# Patient Record
Sex: FEMALE | Race: WHITE | Employment: FULL TIME | URBAN - NONMETROPOLITAN AREA
[De-identification: names, ages, dates, MRNs, and addresses within clinical notes are randomized per-mention and may not be internally consistent; named-entity substitution may affect disease eponyms.]

---

## 2017-03-01 PROBLEM — Z41.1 ENCOUNTER FOR COSMETIC SURGERY: Status: ACTIVE | Noted: 2017-03-01

## 2023-05-23 PROBLEM — R35.0 URINARY FREQUENCY: Status: ACTIVE | Noted: 2020-10-19

## 2023-05-23 PROBLEM — F31.81 BIPOLAR 2 DISORDER, MAJOR DEPRESSIVE EPISODE (HCC): Status: ACTIVE | Noted: 2017-08-18

## 2023-05-23 PROBLEM — G89.29 CHRONIC LOW BACK PAIN: Status: ACTIVE | Noted: 2019-11-26

## 2023-05-23 PROBLEM — J45.40 MODERATE PERSISTENT ASTHMA WITHOUT COMPLICATION: Status: ACTIVE | Noted: 2017-09-08

## 2023-05-23 PROBLEM — G47.30 SLEEP APNEA: Status: ACTIVE | Noted: 2023-05-23

## 2023-05-23 PROBLEM — M54.50 CHRONIC LOW BACK PAIN: Status: ACTIVE | Noted: 2019-11-26

## 2023-05-23 RX ORDER — HYDROCODONE BITARTRATE AND ACETAMINOPHEN 5; 325 MG/1; MG/1
TABLET ORAL
COMMUNITY
Start: 2023-05-12

## 2023-05-23 RX ORDER — CITALOPRAM 40 MG/1
40 TABLET ORAL DAILY
COMMUNITY
End: 2023-06-09

## 2023-05-23 RX ORDER — GABAPENTIN 800 MG/1
800 TABLET ORAL 4 TIMES DAILY
COMMUNITY

## 2023-05-23 RX ORDER — LITHIUM CARBONATE 300 MG
150 TABLET ORAL 2 TIMES DAILY
COMMUNITY
Start: 2023-05-05 | End: 2023-06-09

## 2023-05-24 ENCOUNTER — OFFICE VISIT (OUTPATIENT)
Dept: SURGERY | Age: 53
End: 2023-05-24
Payer: COMMERCIAL

## 2023-05-24 VITALS
BODY MASS INDEX: 36.98 KG/M2 | SYSTOLIC BLOOD PRESSURE: 118 MMHG | DIASTOLIC BLOOD PRESSURE: 80 MMHG | HEART RATE: 58 BPM | TEMPERATURE: 97.3 F | WEIGHT: 235.6 LBS | HEIGHT: 67 IN | OXYGEN SATURATION: 97 %

## 2023-05-24 DIAGNOSIS — K61.1 PERIRECTAL ABSCESS: Primary | ICD-10-CM

## 2023-05-24 PROBLEM — M54.16 LUMBAR RADICULOPATHY: Status: ACTIVE | Noted: 2021-06-16

## 2023-05-24 PROCEDURE — 99204 OFFICE O/P NEW MOD 45 MIN: CPT | Performed by: SURGERY

## 2023-05-24 RX ORDER — AMOXICILLIN AND CLAVULANATE POTASSIUM 875; 125 MG/1; MG/1
TABLET, FILM COATED ORAL
COMMUNITY
Start: 2023-05-22

## 2023-05-24 RX ORDER — ALBUTEROL SULFATE 90 UG/1
AEROSOL, METERED RESPIRATORY (INHALATION)
COMMUNITY

## 2023-05-24 RX ORDER — LATANOPROST 50 UG/ML
SOLUTION/ DROPS OPHTHALMIC
COMMUNITY

## 2023-05-24 RX ORDER — TOPIRAMATE 25 MG/1
TABLET ORAL
COMMUNITY
Start: 2023-03-03

## 2023-05-24 RX ORDER — SOLIFENACIN SUCCINATE 10 MG/1
TABLET, FILM COATED ORAL
COMMUNITY
Start: 2023-03-03

## 2023-05-24 RX ORDER — ONDANSETRON 4 MG/1
TABLET, ORALLY DISINTEGRATING ORAL
COMMUNITY

## 2023-05-24 NOTE — PROGRESS NOTES
Procedure Note - Incision and Drainage Abscess      5/24/2023  Toney Kearney [id number]    Date of Service:  5/24/2023    Pre Op Dx: Abscess of the left perirectal region    Post Op Dx: Same    Procedure: Incision and drainage of perirectal abscess. Surgeon: Silvino Lopez DO    Anesthesia: Local anesthesia only    EBL: * No surgery found *    Complications: none    Specimen:  None    Drains: none    Findings: Abscess cavity measuring 3 cm X 3 cm X 4 cm (deep) with a scant amount of purulent fluid      Operative Procedure:  Informed consent was obtained from the the patient. After the full disclosure of risks, benefits and alternative therapies, the area was assessed and the procedure was performed in the office. The patient was positioned in the right lateral decubitus position. The infected region was prepped and draped in the usual sterile fashion. Next, 1% Lidocaine with epinephrine was used to infiltrate the area of the planned procedure in the orientation of the proposed incision. Using a #11 blade, an incision was carried through the layers of the subcutaneous tissues and a scant amount of purulent fluid  was expressed from wound. Cultures were taken, both aerobic and anaerobic. The incision was  extended in a horizontal orientation to allow for continued drainage. Blunt dissection of the abscess cavity extending through the subcutaneous tissues, breaking up all loculations. Manual compression of the affected area was also performed to express any residual fluid. The wound was copiously irrigated with normal saline, and inspected for any further evidence of loculation or drainage from untreated areas. There was none identified. Hemostasis was ensured with electrocautery. The abscess cavity was packed with nothing, and allowed to drain freely. Dressed with pressure dressing using 4x4s. Thank you for allowing me to participate in the care of your patient.   My best effort and attention was

## 2023-06-02 ENCOUNTER — OFFICE VISIT (OUTPATIENT)
Dept: SURGERY | Age: 53
End: 2023-06-02

## 2023-06-02 VITALS
HEART RATE: 72 BPM | HEIGHT: 67 IN | RESPIRATION RATE: 18 BRPM | TEMPERATURE: 97.5 F | DIASTOLIC BLOOD PRESSURE: 64 MMHG | BODY MASS INDEX: 36.26 KG/M2 | SYSTOLIC BLOOD PRESSURE: 112 MMHG | WEIGHT: 231 LBS | OXYGEN SATURATION: 96 %

## 2023-06-02 DIAGNOSIS — T81.49XA POST-OPERATIVE WOUND ABSCESS: Primary | ICD-10-CM

## 2023-06-02 PROCEDURE — 99024 POSTOP FOLLOW-UP VISIT: CPT | Performed by: SURGERY

## 2023-06-02 RX ORDER — CIPROFLOXACIN 500 MG/1
500 TABLET, FILM COATED ORAL 2 TIMES DAILY
Qty: 14 TABLET | Refills: 0 | Status: SHIPPED | OUTPATIENT
Start: 2023-06-02 | End: 2023-06-09

## 2023-06-02 NOTE — PROGRESS NOTES
Procedure Note - Incision and Drainage Abscess      6/2/2023  Tiana Baez [id number]    Date of Service:  6/2/2023    Pre Op Dx: Abscess of the left perirectal region    Post Op Dx: Same    Procedure: Repeat incision and drainage of perirectal abscess. Surgeon: Rossy Betancourt DO    Anesthesia: Local anesthesia only    EBL: Minimal    Complications: none    Specimen:  None; no evidence of abscess or purulent drainage    Drains: none    Findings: Presumed abscess cavity in previous location    Operative Procedure:  Informed consent was obtained from the the patient. After the full disclosure of risks, benefits and alternative therapies, the area was assessed and the procedure was performed in the office. The patient was positioned in the right lateral decubitus position. The infected region was prepped and draped in the usual sterile fashion. Next, 1% Lidocaine with epinephrine was used to infiltrate the area of the planned procedure in the orientation of the proposed incision. Using a #11 blade, an incision was carried through the layers of the subcutaneous tissues and no fluid  was expressed from wound. Cultures were taken, both aerobic and anaerobic. The incision was  extended in a horizontal orientation to allow for continued drainage. Blunt dissection of the abscess cavity extending through the subcutaneous tissues, breaking up all loculations. Manual compression of the affected area was also performed to express any residual fluid. The wound was copiously irrigated with normal saline, and inspected for any further evidence of loculation or drainage from untreated areas. There was none identified. Hemostasis was ensured with electrocautery. The abscess cavity was packed with 1/4 inch plain NuGauze. Thank you for allowing me to participate in the care of your patient. My best effort and attention was provided. DR. Ashok Richardson.  MYRA, [address], DO
solifenacin (VESICARE) 10 MG tablet solifenacin 10 mg tablet      topiramate (TOPAMAX) 25 MG tablet       ondansetron (ZOFRAN-ODT) 4 MG disintegrating tablet ondansetron 4 mg disintegrating tablet   DISSOLVE 1 TABLET IN MOUTH EVERY 6 TO 8 HOURS AS NEEDED      loratadine-pseudoephedrine (CLARITIN-D 24HR)  MG per extended release tablet Allergy Relief and Nasal Decongestant 10 mg-240 mg tablet,extended rel   TAKE 1 TABLET BY MOUTH ONCE DAILY FOR 14 DAYS      latanoprost (XALATAN) 0.005 % ophthalmic solution latanoprost 0.005 % eye drops      hydrocortisone 2.5 % cream hydrocortisone 2.5 % topical cream with perineal applicator      albuterol sulfate HFA (PROVENTIL;VENTOLIN;PROAIR) 108 (90 Base) MCG/ACT inhaler albuterol sulfate HFA 90 mcg/actuation aerosol inhaler      citalopram (CELEXA) 40 MG tablet Take 1 tablet by mouth daily      gabapentin (NEURONTIN) 800 MG tablet Take 1 tablet by mouth in the morning, at noon, in the evening, and at bedtime. lithium 300 MG tablet Take 0.5 tablets by mouth in the morning and at bedtime      HYDROcodone-acetaminophen (NORCO) 5-325 MG per tablet        No current facility-administered medications for this visit.      No Known Allergies  Social History     Socioeconomic History    Marital status:      Spouse name: Not on file    Number of children: Not on file    Years of education: Not on file    Highest education level: Not on file   Occupational History    Not on file   Tobacco Use    Smoking status: Former     Types: Cigarettes     Quit date: 2015     Years since quittin.1    Smokeless tobacco: Not on file   Vaping Use    Vaping Use: Never used   Substance and Sexual Activity    Alcohol use: No    Drug use: No    Sexual activity: Yes     Partners: Male   Other Topics Concern    Not on file   Social History Narrative    Not on file     Social Determinants of Health     Financial Resource Strain: Not on file   Food Insecurity: Not on file

## 2023-06-09 ENCOUNTER — OFFICE VISIT (OUTPATIENT)
Dept: SURGERY | Age: 53
End: 2023-06-09

## 2023-06-09 VITALS
BODY MASS INDEX: 35.63 KG/M2 | HEIGHT: 67 IN | HEART RATE: 57 BPM | SYSTOLIC BLOOD PRESSURE: 104 MMHG | RESPIRATION RATE: 18 BRPM | TEMPERATURE: 97.6 F | OXYGEN SATURATION: 98 % | DIASTOLIC BLOOD PRESSURE: 60 MMHG | WEIGHT: 227 LBS

## 2023-06-09 DIAGNOSIS — Z09 SURGICAL FOLLOWUP VISIT: Primary | ICD-10-CM

## 2023-06-09 RX ORDER — DULOXETIN HYDROCHLORIDE 30 MG/1
CAPSULE, DELAYED RELEASE ORAL
COMMUNITY
Start: 2023-06-02

## 2023-06-09 RX ORDER — AMOXICILLIN 500 MG/1
CAPSULE ORAL
COMMUNITY
Start: 2023-05-26

## 2023-06-09 NOTE — PROGRESS NOTES
Date of Service:  6/9/2023    Subjective:     Patient ID: Romeo Martines is a 52 y.o.  female. Chief Complaint: Follow up appointment from procedure for I&D perirectal abscess x 2    Patient has no complaints of pain or drainage at the previous incision site.      Past Medical History:   Diagnosis Date    Anxiety     Asthma     Bipolar disorder ([facility] [address]     Depression     Glaucoma     Hypertension     Neuropathy     Wears glasses      Family History   Problem Relation Age of Onset    Other Mother         sleep apnea    Heart Disease Mother     High Blood Pressure Mother     Stroke Mother     COPD Father     Cancer Father         skin    Heart Disease Sister     High Blood Pressure Sister     Kidney Disease Sister         has 1 kidney    COPD Brother         hypotensive    Diabetes Maternal Grandmother     Heart Disease Maternal Grandmother     High Blood Pressure Maternal Grandmother     Kidney Disease Maternal Grandmother         has 1 kidney    Stroke Maternal Grandmother     Heart Disease Maternal Grandfather     High Blood Pressure Maternal Grandfather     Kidney Disease Maternal Grandfather     Stroke Maternal Grandfather     Asthma Neg Hx      Past Surgical History:   Procedure Laterality Date    BLADDER SUSPENSION      CHOLECYSTECTOMY, LAPAROSCOPIC  2013    COLONOSCOPY  11/30/2018    Dr. Teresa Jaffe, COLON, DIAGNOSTIC      FOOT SURGERY Left 03/2016    screws placed in foot for fracture    GASTRIC BYPASS SURGERY  2002    HEEL SPUR SURGERY Right     TONSILLECTOMY      as child    Bethany Tracy  2005    Dr. Anderson \[facility]\""     Current Outpatient Medications   Medication Sig Dispense Refill    amoxicillin (AMOXIL) 500 MG capsule amoxicillin 500 mg capsule   TAKE 1 CAPSULE BY MOUTH EVERY 8 HOURS FOR 10 DAYS      DULoxetine (CYMBALTA) 30 MG extended release capsule       solifenacin (VESICARE) 10 MG tablet solifenacin 10 mg tablet      topiramate (TOPAMAX) 25 MG tablet

## 2023-12-08 ENCOUNTER — HOSPITAL ENCOUNTER (INPATIENT)
Age: 53
LOS: 3 days | Discharge: HOME HEALTH | DRG: 395 | End: 2023-12-11
Attending: SURGERY | Admitting: SURGERY
Payer: COMMERCIAL

## 2023-12-08 DIAGNOSIS — K61.1 PERIRECTAL ABSCESS: Primary | ICD-10-CM

## 2023-12-08 LAB
ANION GAP SERPL CALC-SCNC: 9 MEQ/L (ref 8–16)
BASOPHILS ABSOLUTE: 0 THOU/MM3 (ref 0–0.1)
BASOPHILS NFR BLD AUTO: 0.6 %
BUN SERPL-MCNC: 15 MG/DL (ref 7–22)
CALCIUM SERPL-MCNC: 8.8 MG/DL (ref 8.5–10.5)
CHLORIDE SERPL-SCNC: 109 MEQ/L (ref 98–111)
CO2 SERPL-SCNC: 22 MEQ/L (ref 23–33)
CREAT SERPL-MCNC: 0.8 MG/DL (ref 0.4–1.2)
DEPRECATED RDW RBC AUTO: 49 FL (ref 35–45)
EOSINOPHIL NFR BLD AUTO: 1.2 %
EOSINOPHILS ABSOLUTE: 0.1 THOU/MM3 (ref 0–0.4)
ERYTHROCYTE [DISTWIDTH] IN BLOOD BY AUTOMATED COUNT: 14.9 % (ref 11.5–14.5)
GFR SERPL CREATININE-BSD FRML MDRD: > 60 ML/MIN/1.73M2
GLUCOSE SERPL-MCNC: 238 MG/DL (ref 70–108)
HCT VFR BLD AUTO: 35.1 % (ref 37–47)
HGB BLD-MCNC: 11 GM/DL (ref 12–16)
IMM GRANULOCYTES # BLD AUTO: 0.02 THOU/MM3 (ref 0–0.07)
IMM GRANULOCYTES NFR BLD AUTO: 0.3 %
LYMPHOCYTES ABSOLUTE: 1.6 THOU/MM3 (ref 1–4.8)
LYMPHOCYTES NFR BLD AUTO: 23.7 %
MCH RBC QN AUTO: 28.4 PG (ref 26–33)
MCHC RBC AUTO-ENTMCNC: 31.3 GM/DL (ref 32.2–35.5)
MCV RBC AUTO: 90.5 FL (ref 81–99)
MONOCYTES ABSOLUTE: 0.3 THOU/MM3 (ref 0.4–1.3)
MONOCYTES NFR BLD AUTO: 4.9 %
NEUTROPHILS NFR BLD AUTO: 69.3 %
NRBC BLD AUTO-RTO: 0 /100 WBC
PLATELET # BLD AUTO: 186 THOU/MM3 (ref 130–400)
PMV BLD AUTO: 10.5 FL (ref 9.4–12.4)
POTASSIUM SERPL-SCNC: 3.7 MEQ/L (ref 3.5–5.2)
RBC # BLD AUTO: 3.88 MILL/MM3 (ref 4.2–5.4)
SEGMENTED NEUTROPHILS ABSOLUTE COUNT: 4.8 THOU/MM3 (ref 1.8–7.7)
SODIUM SERPL-SCNC: 140 MEQ/L (ref 135–145)
WBC # BLD AUTO: 6.9 THOU/MM3 (ref 4.8–10.8)

## 2023-12-08 PROCEDURE — 1200000000 HC SEMI PRIVATE

## 2023-12-08 PROCEDURE — 6360000002 HC RX W HCPCS: Performed by: SURGERY

## 2023-12-08 PROCEDURE — 6360000002 HC RX W HCPCS: Performed by: NURSE PRACTITIONER

## 2023-12-08 PROCEDURE — 80048 BASIC METABOLIC PNL TOTAL CA: CPT

## 2023-12-08 PROCEDURE — 85025 COMPLETE CBC W/AUTO DIFF WBC: CPT

## 2023-12-08 PROCEDURE — 36415 COLL VENOUS BLD VENIPUNCTURE: CPT

## 2023-12-08 PROCEDURE — 2580000003 HC RX 258: Performed by: SURGERY

## 2023-12-08 PROCEDURE — 2580000003 HC RX 258: Performed by: NURSE PRACTITIONER

## 2023-12-08 RX ORDER — POTASSIUM CHLORIDE 7.45 MG/ML
10 INJECTION INTRAVENOUS PRN
Status: DISCONTINUED | OUTPATIENT
Start: 2023-12-08 | End: 2023-12-11 | Stop reason: HOSPADM

## 2023-12-08 RX ORDER — SODIUM CHLORIDE 0.9 % (FLUSH) 0.9 %
5-40 SYRINGE (ML) INJECTION PRN
Status: DISCONTINUED | OUTPATIENT
Start: 2023-12-08 | End: 2023-12-11 | Stop reason: HOSPADM

## 2023-12-08 RX ORDER — SODIUM CHLORIDE 9 MG/ML
INJECTION, SOLUTION INTRAVENOUS CONTINUOUS
Status: DISCONTINUED | OUTPATIENT
Start: 2023-12-08 | End: 2023-12-08

## 2023-12-08 RX ORDER — SODIUM CHLORIDE 0.9 % (FLUSH) 0.9 %
5-40 SYRINGE (ML) INJECTION EVERY 12 HOURS SCHEDULED
Status: DISCONTINUED | OUTPATIENT
Start: 2023-12-08 | End: 2023-12-11 | Stop reason: HOSPADM

## 2023-12-08 RX ORDER — ONDANSETRON 2 MG/ML
4 INJECTION INTRAMUSCULAR; INTRAVENOUS EVERY 6 HOURS PRN
Status: DISCONTINUED | OUTPATIENT
Start: 2023-12-08 | End: 2023-12-11 | Stop reason: HOSPADM

## 2023-12-08 RX ORDER — SODIUM CHLORIDE 9 MG/ML
INJECTION, SOLUTION INTRAVENOUS CONTINUOUS
Status: DISCONTINUED | OUTPATIENT
Start: 2023-12-08 | End: 2023-12-09

## 2023-12-08 RX ORDER — MAGNESIUM SULFATE IN WATER 40 MG/ML
2000 INJECTION, SOLUTION INTRAVENOUS PRN
Status: DISCONTINUED | OUTPATIENT
Start: 2023-12-08 | End: 2023-12-11 | Stop reason: HOSPADM

## 2023-12-08 RX ORDER — ONDANSETRON 4 MG/1
4 TABLET, ORALLY DISINTEGRATING ORAL EVERY 8 HOURS PRN
Status: DISCONTINUED | OUTPATIENT
Start: 2023-12-08 | End: 2023-12-11 | Stop reason: HOSPADM

## 2023-12-08 RX ORDER — POTASSIUM CHLORIDE 20 MEQ/1
40 TABLET, EXTENDED RELEASE ORAL PRN
Status: DISCONTINUED | OUTPATIENT
Start: 2023-12-08 | End: 2023-12-11 | Stop reason: HOSPADM

## 2023-12-08 RX ORDER — SODIUM CHLORIDE 9 MG/ML
INJECTION, SOLUTION INTRAVENOUS PRN
Status: DISCONTINUED | OUTPATIENT
Start: 2023-12-08 | End: 2023-12-11 | Stop reason: HOSPADM

## 2023-12-08 RX ADMIN — HYDROMORPHONE HYDROCHLORIDE 0.5 MG: 1 INJECTION, SOLUTION INTRAMUSCULAR; INTRAVENOUS; SUBCUTANEOUS at 19:18

## 2023-12-08 RX ADMIN — SODIUM CHLORIDE: 9 INJECTION, SOLUTION INTRAVENOUS at 23:57

## 2023-12-08 RX ADMIN — PIPERACILLIN AND TAZOBACTAM 3375 MG: 3; .375 INJECTION, POWDER, LYOPHILIZED, FOR SOLUTION INTRAVENOUS at 21:03

## 2023-12-08 RX ADMIN — PIPERACILLIN AND TAZOBACTAM 4500 MG: 4; .5 INJECTION, POWDER, FOR SOLUTION INTRAVENOUS at 15:50

## 2023-12-08 RX ADMIN — ONDANSETRON 4 MG: 2 INJECTION INTRAMUSCULAR; INTRAVENOUS at 15:52

## 2023-12-08 RX ADMIN — HYDROMORPHONE HYDROCHLORIDE 0.5 MG: 1 INJECTION, SOLUTION INTRAMUSCULAR; INTRAVENOUS; SUBCUTANEOUS at 23:27

## 2023-12-08 RX ADMIN — HYDROMORPHONE HYDROCHLORIDE 0.5 MG: 1 INJECTION, SOLUTION INTRAMUSCULAR; INTRAVENOUS; SUBCUTANEOUS at 15:52

## 2023-12-08 RX ADMIN — SODIUM CHLORIDE: 9 INJECTION, SOLUTION INTRAVENOUS at 15:50

## 2023-12-08 ASSESSMENT — PAIN SCALES - GENERAL
PAINLEVEL_OUTOF10: 7
PAINLEVEL_OUTOF10: 9
PAINLEVEL_OUTOF10: 8
PAINLEVEL_OUTOF10: 5
PAINLEVEL_OUTOF10: 8

## 2023-12-08 ASSESSMENT — PAIN DESCRIPTION - LOCATION
LOCATION: BUTTOCKS
LOCATION: RECTUM
LOCATION: BUTTOCKS

## 2023-12-08 ASSESSMENT — PAIN DESCRIPTION - PAIN TYPE
TYPE: ACUTE PAIN

## 2023-12-08 ASSESSMENT — PAIN DESCRIPTION - ORIENTATION
ORIENTATION: LEFT

## 2023-12-08 ASSESSMENT — PAIN DESCRIPTION - ONSET
ONSET: ON-GOING

## 2023-12-08 ASSESSMENT — PAIN DESCRIPTION - FREQUENCY
FREQUENCY: CONTINUOUS

## 2023-12-08 ASSESSMENT — PAIN - FUNCTIONAL ASSESSMENT
PAIN_FUNCTIONAL_ASSESSMENT: ACTIVITIES ARE NOT PREVENTED

## 2023-12-08 ASSESSMENT — PAIN DESCRIPTION - DESCRIPTORS
DESCRIPTORS: ACHING;NAGGING
DESCRIPTORS: ACHING;NAGGING
DESCRIPTORS: SHARP;SORE

## 2023-12-08 NOTE — PROGRESS NOTES
Pt is a direct admit from Dr. Pitts Render office, admitted to 6A rm 4 oriented to room call light in reach.

## 2023-12-09 ENCOUNTER — ANESTHESIA (OUTPATIENT)
Dept: OPERATING ROOM | Age: 53
End: 2023-12-09
Payer: COMMERCIAL

## 2023-12-09 ENCOUNTER — ANESTHESIA EVENT (OUTPATIENT)
Dept: OPERATING ROOM | Age: 53
End: 2023-12-09
Payer: COMMERCIAL

## 2023-12-09 LAB
REASON FOR REJECTION: NORMAL
REJECTED TEST: NORMAL

## 2023-12-09 PROCEDURE — 2580000003 HC RX 258: Performed by: SURGERY

## 2023-12-09 PROCEDURE — 3700000000 HC ANESTHESIA ATTENDED CARE: Performed by: SURGERY

## 2023-12-09 PROCEDURE — 6360000002 HC RX W HCPCS: Performed by: NURSE PRACTITIONER

## 2023-12-09 PROCEDURE — 6360000002 HC RX W HCPCS: Performed by: SURGERY

## 2023-12-09 PROCEDURE — 2500000003 HC RX 250 WO HCPCS: Performed by: SURGERY

## 2023-12-09 PROCEDURE — 0DBQ0ZZ EXCISION OF ANUS, OPEN APPROACH: ICD-10-PCS | Performed by: SURGERY

## 2023-12-09 PROCEDURE — 2709999900 HC NON-CHARGEABLE SUPPLY: Performed by: SURGERY

## 2023-12-09 PROCEDURE — 6360000002 HC RX W HCPCS: Performed by: ANESTHESIOLOGY

## 2023-12-09 PROCEDURE — 6360000002 HC RX W HCPCS

## 2023-12-09 PROCEDURE — 7100000001 HC PACU RECOVERY - ADDTL 15 MIN: Performed by: SURGERY

## 2023-12-09 PROCEDURE — 3600000012 HC SURGERY LEVEL 2 ADDTL 15MIN: Performed by: SURGERY

## 2023-12-09 PROCEDURE — 3600000002 HC SURGERY LEVEL 2 BASE: Performed by: SURGERY

## 2023-12-09 PROCEDURE — 2500000003 HC RX 250 WO HCPCS

## 2023-12-09 PROCEDURE — 3700000001 HC ADD 15 MINUTES (ANESTHESIA): Performed by: SURGERY

## 2023-12-09 PROCEDURE — 2580000003 HC RX 258: Performed by: NURSE PRACTITIONER

## 2023-12-09 PROCEDURE — 1200000000 HC SEMI PRIVATE

## 2023-12-09 PROCEDURE — 7100000000 HC PACU RECOVERY - FIRST 15 MIN: Performed by: SURGERY

## 2023-12-09 RX ORDER — LIDOCAINE HCL/PF 100 MG/5ML
SYRINGE (ML) INJECTION PRN
Status: DISCONTINUED | OUTPATIENT
Start: 2023-12-09 | End: 2023-12-09 | Stop reason: SDUPTHER

## 2023-12-09 RX ORDER — PROPOFOL 10 MG/ML
INJECTION, EMULSION INTRAVENOUS PRN
Status: DISCONTINUED | OUTPATIENT
Start: 2023-12-09 | End: 2023-12-09 | Stop reason: SDUPTHER

## 2023-12-09 RX ORDER — ONDANSETRON 2 MG/ML
INJECTION INTRAMUSCULAR; INTRAVENOUS PRN
Status: DISCONTINUED | OUTPATIENT
Start: 2023-12-09 | End: 2023-12-09 | Stop reason: SDUPTHER

## 2023-12-09 RX ORDER — GLYCOPYRROLATE 0.2 MG/ML
INJECTION INTRAMUSCULAR; INTRAVENOUS PRN
Status: DISCONTINUED | OUTPATIENT
Start: 2023-12-09 | End: 2023-12-09 | Stop reason: SDUPTHER

## 2023-12-09 RX ORDER — SODIUM CHLORIDE 9 MG/ML
INJECTION, SOLUTION INTRAVENOUS CONTINUOUS
Status: DISCONTINUED | OUTPATIENT
Start: 2023-12-09 | End: 2023-12-11 | Stop reason: HOSPADM

## 2023-12-09 RX ORDER — FENTANYL CITRATE 50 UG/ML
INJECTION, SOLUTION INTRAMUSCULAR; INTRAVENOUS PRN
Status: DISCONTINUED | OUTPATIENT
Start: 2023-12-09 | End: 2023-12-09 | Stop reason: SDUPTHER

## 2023-12-09 RX ORDER — DEXAMETHASONE SODIUM PHOSPHATE 10 MG/ML
INJECTION, EMULSION INTRAMUSCULAR; INTRAVENOUS PRN
Status: DISCONTINUED | OUTPATIENT
Start: 2023-12-09 | End: 2023-12-09 | Stop reason: SDUPTHER

## 2023-12-09 RX ORDER — FENTANYL CITRATE 50 UG/ML
INJECTION, SOLUTION INTRAMUSCULAR; INTRAVENOUS
Status: COMPLETED
Start: 2023-12-09 | End: 2023-12-09

## 2023-12-09 RX ORDER — FENTANYL CITRATE 50 UG/ML
50 INJECTION, SOLUTION INTRAMUSCULAR; INTRAVENOUS EVERY 5 MIN PRN
Status: DISCONTINUED | OUTPATIENT
Start: 2023-12-09 | End: 2023-12-09 | Stop reason: HOSPADM

## 2023-12-09 RX ADMIN — PROPOFOL 150 MG: 10 INJECTION, EMULSION INTRAVENOUS at 07:42

## 2023-12-09 RX ADMIN — FENTANYL CITRATE 50 MCG: 50 INJECTION, SOLUTION INTRAMUSCULAR; INTRAVENOUS at 08:04

## 2023-12-09 RX ADMIN — HYDROMORPHONE HYDROCHLORIDE 0.5 MG: 1 INJECTION, SOLUTION INTRAMUSCULAR; INTRAVENOUS; SUBCUTANEOUS at 16:01

## 2023-12-09 RX ADMIN — DEXAMETHASONE SODIUM PHOSPHATE 5 MG: 10 INJECTION, EMULSION INTRAMUSCULAR; INTRAVENOUS at 07:42

## 2023-12-09 RX ADMIN — PIPERACILLIN AND TAZOBACTAM 3375 MG: 3; .375 INJECTION, POWDER, LYOPHILIZED, FOR SOLUTION INTRAVENOUS at 16:30

## 2023-12-09 RX ADMIN — FENTANYL CITRATE 50 MCG: 50 INJECTION, SOLUTION INTRAMUSCULAR; INTRAVENOUS at 08:01

## 2023-12-09 RX ADMIN — HYDROMORPHONE HYDROCHLORIDE 0.5 MG: 1 INJECTION, SOLUTION INTRAMUSCULAR; INTRAVENOUS; SUBCUTANEOUS at 10:09

## 2023-12-09 RX ADMIN — HYDROMORPHONE HYDROCHLORIDE 0.5 MG: 1 INJECTION, SOLUTION INTRAMUSCULAR; INTRAVENOUS; SUBCUTANEOUS at 13:11

## 2023-12-09 RX ADMIN — GLYCOPYRROLATE 0.2 MG: 0.2 INJECTION INTRAMUSCULAR; INTRAVENOUS at 08:09

## 2023-12-09 RX ADMIN — PIPERACILLIN AND TAZOBACTAM 3375 MG: 3; .375 INJECTION, POWDER, LYOPHILIZED, FOR SOLUTION INTRAVENOUS at 05:46

## 2023-12-09 RX ADMIN — HYDROMORPHONE HYDROCHLORIDE 0.5 MG: 1 INJECTION, SOLUTION INTRAMUSCULAR; INTRAVENOUS; SUBCUTANEOUS at 20:38

## 2023-12-09 RX ADMIN — FENTANYL CITRATE 50 MCG: 50 INJECTION INTRAMUSCULAR; INTRAVENOUS at 08:35

## 2023-12-09 RX ADMIN — ONDANSETRON 4 MG: 2 INJECTION INTRAMUSCULAR; INTRAVENOUS at 07:42

## 2023-12-09 RX ADMIN — SODIUM CHLORIDE, PRESERVATIVE FREE 10 ML: 5 INJECTION INTRAVENOUS at 20:39

## 2023-12-09 RX ADMIN — Medication 100 MG: at 07:42

## 2023-12-09 RX ADMIN — FENTANYL CITRATE 50 MCG: 50 INJECTION INTRAMUSCULAR; INTRAVENOUS at 08:30

## 2023-12-09 RX ADMIN — HYDROMORPHONE HYDROCHLORIDE 0.5 MG: 1 INJECTION, SOLUTION INTRAMUSCULAR; INTRAVENOUS; SUBCUTANEOUS at 03:08

## 2023-12-09 ASSESSMENT — PAIN DESCRIPTION - LOCATION
LOCATION: BUTTOCKS

## 2023-12-09 ASSESSMENT — PAIN DESCRIPTION - DESCRIPTORS
DESCRIPTORS: SORE
DESCRIPTORS: SORE
DESCRIPTORS: PRESSURE
DESCRIPTORS: SORE

## 2023-12-09 ASSESSMENT — PAIN SCALES - GENERAL
PAINLEVEL_OUTOF10: 7
PAINLEVEL_OUTOF10: 8
PAINLEVEL_OUTOF10: 8
PAINLEVEL_OUTOF10: 5
PAINLEVEL_OUTOF10: 5
PAINLEVEL_OUTOF10: 8
PAINLEVEL_OUTOF10: 8

## 2023-12-09 ASSESSMENT — PAIN DESCRIPTION - ONSET
ONSET: ON-GOING
ONSET: ON-GOING

## 2023-12-09 ASSESSMENT — PAIN DESCRIPTION - FREQUENCY
FREQUENCY: CONTINUOUS
FREQUENCY: CONTINUOUS

## 2023-12-09 ASSESSMENT — PAIN - FUNCTIONAL ASSESSMENT
PAIN_FUNCTIONAL_ASSESSMENT: ACTIVITIES ARE NOT PREVENTED
PAIN_FUNCTIONAL_ASSESSMENT: ACTIVITIES ARE NOT PREVENTED

## 2023-12-09 ASSESSMENT — PAIN DESCRIPTION - PAIN TYPE: TYPE: ACUTE PAIN

## 2023-12-09 ASSESSMENT — PAIN DESCRIPTION - ORIENTATION
ORIENTATION: MID
ORIENTATION: MID
ORIENTATION: MID;INNER
ORIENTATION: LEFT

## 2023-12-09 NOTE — OP NOTE
[place] [address]                                OPERATIVE REPORT    PATIENT NAME: Eric Arauz                     :        1970  MED REC NO:   [id number]                           ROOM:       [id number]  ACCOUNT NO:   [de-identified]                           ADMIT DATE: 2023  PROVIDER:     Ke Mcdaniel. Loren Sy MD    DATE OF PROCEDURE:  2023    PREOPERATIVE DIAGNOSIS:  Chronic perianal abscess. POSTOPERATIVE DIAGNOSIS:  Chronic perianal abscess. OPERATION:  Excisional debridement 5 x 2 cm perianal tissue of chronic  perianal abscess. SURGEON:  Ke Mcdaniel. MD Flora.    ANESTHESIA:  General.    COMPLICATIONS:  None. BLOOD LOSS:  20 mL. INDICATIONS FOR PROCEDURE:  The patient is a 18-year-old female, who has  had two prior I and D's of a perianal abscess performed in an outpatient  setting per another physician. This occurring in May and then in early  . The patient apparently had healing, but then had onset over the  last two to three weeks of increasing pain and swelling and was seen in  the office yesterday with an area that was likely a fistula and then  lateral to that, a significantly indurated and inflamed tissue with  purulent fluid coming out of the rectum and from the fistula site. The  patient is here for anal exam and further I and D/debridement. FINDINGS:  With 24 hours of antibiotics, there was decreased pus coming  from the rectum. There was a fistula. The fistula was excised along  with a fistulous tract along with debridement of the perianal tissue. I  did not do a transsphincteric fistulotomy, but went retro-sphincter. There was no evidence of any further pus at the conclusion of the  procedure. DESCRIPTION OF THE PROCEDURE:  The patient was brought to the operating  suite, placed supine on the operating room table.   After adequate  inhalational anesthesia was administered, the patient was placed up in  stirrups and the perianal area was prepped and draped in usual sterile  fashion. The patient had palpation of the fistula site at about the 3  o'clock position in the perianal area, roughly 1 inch away from the anal  opening and then lateral to this was the induration and firmness. We  put a finger in the rectum initially. There were no palpable masses. I  put a retractor in the anus. I thought I could see possibly the entrance  of the fistula into the lateral anal canal on the left, but again there  was no pus coming out with palpation as it was yesterday. At this point  in time, I elected to do an elliptical incision to include the fistulous  opening on the skin and going laterally, and we entered into another  abscess cavity and the pus was drained. I did debridement of  chronically indurated tissue, the length of 5 x 2 cm. Once this was all  accomplished, it should be noted a blood fair amount due to inflammation  and hemostasis obtained electrocautery. We put the finger back in the  rectum and followed this inflammatory area that went retro-sphincter and  I felt like I had opened up the areas for drainage appropriately. I did  not feel that we should do a transsphincteric fistulotomy. At this  point in time, we irrigated with Irrisept. There was no further  purulent material and then placed a 1-inch iodoform gauze into the  wound. The patient tolerated the procedure well. KATHY OCONNELL [facility], MD    D: 12/09/2023 8:41:32       T: 12/09/2023 8:45:23     [id number]  Job#: [id number]     Doc#: [id number]    CC:

## 2023-12-09 NOTE — PLAN OF CARE
Problem: Discharge Planning  Goal: Discharge to home or other facility with appropriate resources  Outcome: Progressing  Flowsheets  Taken 12/8/2023 2014 by Ashley Peters RN  Discharge to home or other facility with appropriate resources:   Identify barriers to discharge with patient and caregiver   Arrange for needed discharge resources and transportation as appropriate   Identify discharge learning needs (meds, wound care, etc)  Taken 12/8/2023 1358 by Wendy Baldwin RN  Discharge to home or other facility with appropriate resources: Identify barriers to discharge with patient and caregiver     Problem: Pain  Goal: Verbalizes/displays adequate comfort level or baseline comfort level  Outcome: Progressing  Flowsheets (Taken 12/8/2023 2014)  Verbalizes/displays adequate comfort level or baseline comfort level:   Encourage patient to monitor pain and request assistance   Implement non-pharmacological measures as appropriate and evaluate response   Assess pain using appropriate pain scale   Administer analgesics based on type and severity of pain and evaluate response     Problem: ABCDS Injury Assessment  Goal: Absence of physical injury  Outcome: Progressing  Flowsheets (Taken 12/8/2023 2014)  Absence of Physical Injury: Implement safety measures based on patient assessment   Care plan reviewed with patient. Patient verbalize understanding of the plan of care and contribute to goal setting.

## 2023-12-09 NOTE — ANESTHESIA PRE PROCEDURE
Department of Anesthesiology  Preprocedure Note       Name:  Billy Topete   Age:  52 y.o.  :  1970                                          MRN:  [id number]         Date:  2023      Surgeon: Ally Larose):  Edgardo Gardner MD    Procedure: Procedure(s):  ANAL EXAM UNDER ANESTHESIA, PERIRECTAL ABSCESS I&D    Medications prior to admission:   Prior to Admission medications    Medication Sig Start Date End Date Taking? Authorizing Provider   DULoxetine (CYMBALTA) 30 MG extended release capsule  23   Mitchell King MD   solifenacin (VESICARE) 10 MG tablet solifenacin 10 mg tablet 3/3/23   Mitchell King MD   topiramate (TOPAMAX) 25 MG tablet  3/3/23   Mitchell King MD   ondansetron (ZOFRAN-ODT) 4 MG disintegrating tablet ondansetron 4 mg disintegrating tablet   DISSOLVE 1 TABLET IN MOUTH EVERY 6 TO 8 HOURS AS NEEDED    Mitchell King MD   latanoprost (XALATAN) 0.005 % ophthalmic solution latanoprost 0.005 % eye drops    Mitchell King MD   hydrocortisone 2.5 % cream hydrocortisone 2.5 % topical cream with perineal applicator    ProviderMitchell MD   albuterol sulfate HFA (PROVENTIL;VENTOLIN;PROAIR) 108 (90 Base) MCG/ACT inhaler albuterol sulfate HFA 90 mcg/actuation aerosol inhaler    Mitchell King MD   gabapentin (NEURONTIN) 800 MG tablet Take 1 tablet by mouth in the morning, at noon, in the evening, and at bedtime.     ProviderMitchell MD   HYDROcodone-acetaminophen [facility]) 5-325 MG per tablet  23   Mitchell King MD       Current medications:    Current Facility-Administered Medications   Medication Dose Route Frequency Provider Last Rate Last Admin    piperacillin-tazobactam (ZOSYN) 3,375 mg in sodium chloride 0.9 % 50 mL IVPB (mini-bag)  3,375 mg IntraVENous Q8H Mirtha Later APRN - CNP 12.5 mL/hr at 23 0546 3,375 mg at 23 0546    0.9 % sodium chloride infusion   IntraVENous Continuous Breezy Hines  mL/hr

## 2023-12-09 NOTE — BRIEF OP NOTE
Brief Postoperative Note      Patient: Billy Topete  YOB: 1970  MRN: [id number]    Date of Procedure: 12/9/2023    Pre-Op Diagnosis Codes:     * Perirectal abscess [G35. 1]Chronic    Post-Op Diagnosis: same       Procedure(s):  Excisional Debridement, Perirectal abscess 5x 2 cm    Surgeon(s):  Edgardo Gardner MD    Assistant:      Anesthesia: General    Estimated Blood Loss (mL): 20 ml    Complications: none    Specimens:   ID Type Source Tests Collected by Time Destination   A : perirectal abscess i&d Tissue Recto sigmoid SURGICAL PATHOLOGY Edgardo Gardner MD 12/9/2023 3831        Implants:        Drains: none iodoform packing    Findings: see op note      Electronically signed by Edgardo Gardner MD on 12/9/2023 at 8:22 AM

## 2023-12-09 NOTE — PROGRESS NOTES
7874 pt arrived to pacu, awakens to voice. LMA removed. Respirations unlabored on room air. Sites CDI x1. VSS. Pt denies pain at this time  0830 pt awake in bed, states pain 8/10. Medicated with 50 mcg fentanyl  0835 no change in pain status, medicated with 50 mcg fentanyl  0840 pt states pain 6/10. Pt falls asleep mid conversation. Not medicated at this time.  VSS  0845 pt resting off and on, VSS  0855 pt meets criteria for discharge from pacu at this time  0921 Pt transported to 8B29 in stable condition

## 2023-12-10 LAB
DEPRECATED MEAN GLUCOSE BLD GHB EST-ACNC: 96 MG/DL (ref 70–126)
DEPRECATED RDW RBC AUTO: 50 FL (ref 35–45)
ERYTHROCYTE [DISTWIDTH] IN BLOOD BY AUTOMATED COUNT: 14.9 % (ref 11.5–14.5)
HBA1C MFR BLD HPLC: 5.2 % (ref 4.4–6.4)
HCT VFR BLD AUTO: 32.9 % (ref 37–47)
HGB BLD-MCNC: 10 GM/DL (ref 12–16)
MCH RBC QN AUTO: 27.9 PG (ref 26–33)
MCHC RBC AUTO-ENTMCNC: 30.4 GM/DL (ref 32.2–35.5)
MCV RBC AUTO: 91.6 FL (ref 81–99)
PLATELET # BLD AUTO: 189 THOU/MM3 (ref 130–400)
PMV BLD AUTO: 10.6 FL (ref 9.4–12.4)
RBC # BLD AUTO: 3.59 MILL/MM3 (ref 4.2–5.4)
WBC # BLD AUTO: 4.5 THOU/MM3 (ref 4.8–10.8)

## 2023-12-10 PROCEDURE — 36415 COLL VENOUS BLD VENIPUNCTURE: CPT

## 2023-12-10 PROCEDURE — 83036 HEMOGLOBIN GLYCOSYLATED A1C: CPT

## 2023-12-10 PROCEDURE — 1200000000 HC SEMI PRIVATE

## 2023-12-10 PROCEDURE — 2500000003 HC RX 250 WO HCPCS: Performed by: SURGERY

## 2023-12-10 PROCEDURE — 6360000002 HC RX W HCPCS: Performed by: SURGERY

## 2023-12-10 PROCEDURE — 85027 COMPLETE CBC AUTOMATED: CPT

## 2023-12-10 PROCEDURE — 2580000003 HC RX 258: Performed by: SURGERY

## 2023-12-10 PROCEDURE — 6370000000 HC RX 637 (ALT 250 FOR IP): Performed by: SURGERY

## 2023-12-10 RX ORDER — OXYCODONE HYDROCHLORIDE AND ACETAMINOPHEN 5; 325 MG/1; MG/1
1 TABLET ORAL EVERY 4 HOURS PRN
Status: DISCONTINUED | OUTPATIENT
Start: 2023-12-10 | End: 2023-12-11 | Stop reason: HOSPADM

## 2023-12-10 RX ADMIN — HYDROMORPHONE HYDROCHLORIDE 0.5 MG: 1 INJECTION, SOLUTION INTRAMUSCULAR; INTRAVENOUS; SUBCUTANEOUS at 00:46

## 2023-12-10 RX ADMIN — PIPERACILLIN AND TAZOBACTAM 3375 MG: 3; .375 INJECTION, POWDER, LYOPHILIZED, FOR SOLUTION INTRAVENOUS at 08:45

## 2023-12-10 RX ADMIN — OXYCODONE HYDROCHLORIDE AND ACETAMINOPHEN 1 TABLET: 5; 325 TABLET ORAL at 16:09

## 2023-12-10 RX ADMIN — HYDROMORPHONE HYDROCHLORIDE 0.5 MG: 1 INJECTION, SOLUTION INTRAMUSCULAR; INTRAVENOUS; SUBCUTANEOUS at 05:43

## 2023-12-10 RX ADMIN — PIPERACILLIN AND TAZOBACTAM 3375 MG: 3; .375 INJECTION, POWDER, LYOPHILIZED, FOR SOLUTION INTRAVENOUS at 17:07

## 2023-12-10 RX ADMIN — HYDROMORPHONE HYDROCHLORIDE 0.5 MG: 1 INJECTION, SOLUTION INTRAMUSCULAR; INTRAVENOUS; SUBCUTANEOUS at 20:30

## 2023-12-10 RX ADMIN — OXYCODONE HYDROCHLORIDE AND ACETAMINOPHEN 1 TABLET: 5; 325 TABLET ORAL at 08:48

## 2023-12-10 RX ADMIN — OXYCODONE HYDROCHLORIDE AND ACETAMINOPHEN 1 TABLET: 5; 325 TABLET ORAL at 12:09

## 2023-12-10 RX ADMIN — PIPERACILLIN AND TAZOBACTAM 3375 MG: 3; .375 INJECTION, POWDER, LYOPHILIZED, FOR SOLUTION INTRAVENOUS at 00:41

## 2023-12-10 ASSESSMENT — PAIN DESCRIPTION - DESCRIPTORS
DESCRIPTORS: ACHING
DESCRIPTORS: DISCOMFORT

## 2023-12-10 ASSESSMENT — PAIN DESCRIPTION - ORIENTATION
ORIENTATION: LEFT

## 2023-12-10 ASSESSMENT — PAIN DESCRIPTION - LOCATION
LOCATION: BUTTOCKS

## 2023-12-10 ASSESSMENT — PAIN SCALES - GENERAL
PAINLEVEL_OUTOF10: 9
PAINLEVEL_OUTOF10: 5
PAINLEVEL_OUTOF10: 9
PAINLEVEL_OUTOF10: 5

## 2023-12-10 ASSESSMENT — PAIN DESCRIPTION - ONSET: ONSET: ON-GOING

## 2023-12-10 ASSESSMENT — PAIN DESCRIPTION - PAIN TYPE
TYPE: ACUTE PAIN
TYPE: ACUTE PAIN

## 2023-12-10 ASSESSMENT — PAIN DESCRIPTION - FREQUENCY
FREQUENCY: CONTINUOUS
FREQUENCY: CONTINUOUS

## 2023-12-10 NOTE — PROGRESS NOTES
Surg POD#1 patient states she feels better versus preop discussed OR findings with patient patient has iodoform packing in place patient does see pain management is on Norco for chronic back pain will be given Percocet here discussed patient's glucose of 238 and hemoglobin A1c ordered for this morning patient does see a CNP in [address]  will await hemoglobin A1c plan is to continue IV antibiotics today possible discharge tomorrow

## 2023-12-11 VITALS
OXYGEN SATURATION: 100 % | HEIGHT: 67 IN | SYSTOLIC BLOOD PRESSURE: 114 MMHG | HEART RATE: 72 BPM | RESPIRATION RATE: 18 BRPM | DIASTOLIC BLOOD PRESSURE: 70 MMHG | TEMPERATURE: 97.4 F | BODY MASS INDEX: 32.87 KG/M2 | WEIGHT: 209.4 LBS

## 2023-12-11 PROBLEM — M25.562 ARTHRALGIA OF BOTH KNEES: Status: ACTIVE | Noted: 2023-05-26

## 2023-12-11 PROBLEM — M25.561 ARTHRALGIA OF BOTH KNEES: Status: ACTIVE | Noted: 2023-05-26

## 2023-12-11 PROCEDURE — 2580000003 HC RX 258: Performed by: SURGERY

## 2023-12-11 PROCEDURE — 2500000003 HC RX 250 WO HCPCS: Performed by: SURGERY

## 2023-12-11 PROCEDURE — 6360000002 HC RX W HCPCS: Performed by: SURGERY

## 2023-12-11 PROCEDURE — 6370000000 HC RX 637 (ALT 250 FOR IP): Performed by: SURGERY

## 2023-12-11 RX ORDER — SULFAMETHOXAZOLE AND TRIMETHOPRIM 800; 160 MG/1; MG/1
1 TABLET ORAL 2 TIMES DAILY
Qty: 14 TABLET | Refills: 0 | Status: SHIPPED | OUTPATIENT
Start: 2023-12-11 | End: 2023-12-18

## 2023-12-11 RX ORDER — OXYCODONE HYDROCHLORIDE AND ACETAMINOPHEN 5; 325 MG/1; MG/1
1 TABLET ORAL EVERY 6 HOURS PRN
Qty: 16 TABLET | Refills: 0 | Status: SHIPPED | OUTPATIENT
Start: 2023-12-11 | End: 2023-12-15

## 2023-12-11 RX ADMIN — PIPERACILLIN AND TAZOBACTAM 3375 MG: 3; .375 INJECTION, POWDER, LYOPHILIZED, FOR SOLUTION INTRAVENOUS at 09:37

## 2023-12-11 RX ADMIN — PIPERACILLIN AND TAZOBACTAM 3375 MG: 3; .375 INJECTION, POWDER, LYOPHILIZED, FOR SOLUTION INTRAVENOUS at 00:10

## 2023-12-11 RX ADMIN — HYDROMORPHONE HYDROCHLORIDE 0.5 MG: 1 INJECTION, SOLUTION INTRAMUSCULAR; INTRAVENOUS; SUBCUTANEOUS at 09:44

## 2023-12-11 RX ADMIN — SODIUM CHLORIDE: 9 INJECTION, SOLUTION INTRAVENOUS at 00:09

## 2023-12-11 RX ADMIN — OXYCODONE HYDROCHLORIDE AND ACETAMINOPHEN 1 TABLET: 5; 325 TABLET ORAL at 06:47

## 2023-12-11 RX ADMIN — OXYCODONE HYDROCHLORIDE AND ACETAMINOPHEN 1 TABLET: 5; 325 TABLET ORAL at 00:24

## 2023-12-11 ASSESSMENT — PAIN SCALES - GENERAL
PAINLEVEL_OUTOF10: 9
PAINLEVEL_OUTOF10: 8
PAINLEVEL_OUTOF10: 7
PAINLEVEL_OUTOF10: 9

## 2023-12-11 ASSESSMENT — PAIN DESCRIPTION - DESCRIPTORS
DESCRIPTORS: BURNING;DISCOMFORT
DESCRIPTORS: ACHING

## 2023-12-11 ASSESSMENT — PAIN DESCRIPTION - LOCATION
LOCATION: BUTTOCKS

## 2023-12-11 ASSESSMENT — PAIN - FUNCTIONAL ASSESSMENT: PAIN_FUNCTIONAL_ASSESSMENT: PREVENTS OR INTERFERES SOME ACTIVE ACTIVITIES AND ADLS

## 2023-12-11 ASSESSMENT — PAIN DESCRIPTION - ORIENTATION
ORIENTATION: LEFT
ORIENTATION: RIGHT;LEFT
ORIENTATION: LEFT

## 2023-12-11 NOTE — CARE COORDINATION
Case Management Assessment  Initial Evaluation    Date/Time of Evaluation: 12/11/2023 11:34 AM  Assessment Completed by: Adriana Mauricio RN    If patient is discharged prior to next notation, then this note serves as note for discharge by case management. Patient Name: Sandie Ugarte                   YOB: 1970  Diagnosis: Perirectal abscess [K61.1]                   Date / Time: 12/8/2023  1:30 PM  Location: [address] [place] [address]     Patient Admission Status: Inpatient   Readmission Risk Low 0-14, Mod 15-19), High > 20: Readmission Risk Score: 8.6    Current PCP: Jose Guardado MD  PCP verified by CM? Yes Thamas Persons)    Chart Reviewed: Yes      History Provided by: Patient  Patient Orientation: Alert and Oriented    Patient Cognition: Alert    Hospitalization in the last 30 days (Readmission):  No    If yes, Readmission Assessment in CM Navigator will be completed. Advance Directives:      Code Status: Full Code   Patient's Primary Decision Maker is: Legal Next of Kin      Discharge Planning:    Patient lives with: Alone Type of Home: Trailer/Mobile Home  Primary Care Giver: Self  Patient Support Systems include: Children   Current Financial resources: Other (Comment) (BCBS)  Current community resources: None  Current services prior to admission: None            Current DME:              Type of Home Care services:  None    ADLS  Prior functional level: Independent in ADLs/IADLs  Current functional level: Other (see comment)    Family can provide assistance at DC: Yes  Would you like Case Management to discuss the discharge plan with any other family members/significant others, and if so, who?  No  Plans to Return to Present Housing: Yes  Other Identified Issues/Barriers to RETURNING to current housing: No  Potential Assistance needed at discharge: Home Care            Potential DME:    Patient expects to discharge to: Trailer/mobile home  Plan for transportation at discharge:

## 2023-12-11 NOTE — CARE COORDINATION
12/11/23, 3:51 PM EST    DISCHARGE PLANNING EVALUATION    Received consult for [facility], patient discharging today. Spoke with patient, she is agreeable to [facility] for nursing to check on wound, no packing needed. Patient stated she can dress wound. Post-acute [facility]) provider list was provided to patient. Patient was informed of their freedom to choose [facility] provider. Discussed and offered to show the patient the relevant [facility] Providers quality and resource use measures on Medicare Compare web site via computer based on patient's goals of care and treatment preferences. Questions regarding selection process were answered. Patient preference is [facility]. Referral made to [facility], left message. 12/11/23, 3:54 PM EST    Patient goals/plan/ treatment preferences discussed by  and . Patient goals/plan/ treatment preferences reviewed with patient/ family. Patient/ family verbalize understanding of discharge plan and are in agreement with goal/plan/treatment preferences. Understanding was demonstrated using the teach back method. AVS provided by RN at time of discharge, which includes all necessary medical information pertaining to the patients current course of illness, treatment, post-discharge goals of care, and treatment preferences. Services At/After Discharge: Home Health and Nursing service, [address] [place] [address].

## 2023-12-11 NOTE — H&P
[place] [address]                              HISTORY AND PHYSICAL    PATIENT NAME: Monique Gustafson                     :        1970  MED REC NO:   [id number]                           ROOM:       [id number]  ACCOUNT NO:   [de-identified]                           ADMIT DATE: 2023  PROVIDER:     Malcolm Chau. Christina Payton MD      CHIEF COMPLAINT:  Persistent and extended perirectal abscess. HISTORY OF PRESENT ILLNESS:  The patient is a 61-year-old female who I  was asked to see by Dr. Marian Gonzalez who saw the patient yesterday who has a  history of having had an apparent perirectal abscess on the left side of  the perirectal tissue that had an I and D performed by Dr. Claude Burnham in the  office on 2023, over six months ago. The patient then was seen in  his office approximately 9 days later on 2023, underwent a second  I and D. The patient was then last seen by Dr. Claude Burnham on 2023  with a note indicating that the area and heeled. The patient  subsequently had hemorrhoid banding by Dr. Marian Gonzalez. She then had onset  over the last 2-3 weeks of increasing pain that she initially stated  within a different area, was seen by Dr. Marian Gonzalez and referred to me for  further evaluation. The patient was seen in the office today. She has  what appears to be a fistula to the previous I and D site and then  extended more abscess even more lateral to that. It is at roughly the 3  o'clock position. When you palpate the firmness which I feel represents  persistent abscess, previous I and D site that is a little more medial.   The pus comes out of all these sites plus pus comes out of the anus. The patient is being admitted at this time for IV antibiotics and will  be going to surgery tomorrow for further I and D and possible  fistulotomy.     PAST MEDICAL HISTORY:  Positive for hypertension, asthma, reflux  disease, bipolar disorder, glaucoma. MEDICATIONS:  Duloxetine, gabapentin, Norco,  solifenacin succinate, and  topiramate. She also takes occasional lithium. PAST SURGICAL HISTORY:  The patient has had a bladder suspension in the  past.  She had gastric bypass. She has had a ventral hernia repaired  post bypass. She has had a tonsillectomy. She has had heel spur  surgery. She has had colonoscopies. She has had a laparoscopic  cholecystectomy and a bladder suspension. ALLERGIES:  NONE. REVIEW OF SYSTEMS:  10-point review of systems negative. PHYSICAL EXAMINATION:  GENERAL:  The patient is a 66-year-old female who appears her age and  she has recently had shoulder surgery. She is sitting in my office, in  a sling on the right side. HEAD, EARS, EYES, NOSE, AND THROAT:  Pupils are equal.  EOMs are intact. Sclerae are clear. CARDIAC:  S1, S2.  LUNGS:  Respirations are clear. ABDOMEN:  Soft. Well-healed incision. EXTREMITIES:  Upper extremities are as mentioned, her right arm is in a  sling. The left arm is normal.  Extremities are normal.  GENITOURINARY:  Examining the anus, the patient has at the 3 o'clock  position, on the left side, a persistent granulation tissue at a prior I  and D site and then lateral to this there is further induration  representing further abscess. When you push on the area of persistent  abscess, pus comes out through a small opening in the skin, pus comes  out of the prior I and D site and actually pus just rolls out of the  anal canal.    ASSESSMENT AND PLAN:  Probable fistula with persistent abscess. Discussed with the patient my recommendation would be for admission with  IV antibiotics as she is in a lot of pain and proceed with surgery  tomorrow that will include likely debridement of some tissue along with  fistulotomy. We will admit the patient as a direct admit. KATHY OCONNELL [facility], MD    D: 12/08/2023 18:54:07       T: 12/08/2023 18:59:00     [id number]  Job#: [id number]

## 2023-12-21 NOTE — DISCHARGE SUMMARY
[place] [address]                               DISCHARGE SUMMARY    PATIENT NAME: Ida Duran                     :        1970  MED REC NO:   [id number]                           ROOM:       9973  ACCOUNT NO:   [de-identified]                           ADMIT DATE: 2023  PROVIDER:     Beth Garcia. Lily Goel MD              [facility] DATE: 2023    OPERATIONS PERFORMED:  Excisional debridement of perianal tissue with  chronic perianal abscess. HOSPITAL COURSE:  The patient is a 40-year-old female, who presented to  the office on 2023 with a persistent perirectal abscess. She was  admitted to the hospital for IV antibiotics and then taken to Surgery  the next day on 2023 with excisional debridement as mentioned. The patient was maintained on antibiotics for two days. Packing had  been removed. She was stable, and it was felt she could be discharged  home. She will be discharged home on Percocet for pain, to resume all  of her other home medications, will be followed up in the office, and  she is being discharged in stable condition. KATHY OCONNELL [facility], MD    D: 2023 17:01:31       T: 2023 18:52:10     [email or web address]  Job#: [id number]     Doc#: [id number]    CC:

## 2024-01-10 NOTE — PROGRESS NOTES
Physician Progress Note      PATIENT:               OWEN GOULD  [facility] #:                  [id number]  :                       1970  ADMIT DATE:       2023 1:30 PM  DISCH DATE:        2023 2:48 PM  RESPONDING  PROVIDER #:        Adalberto Hines MD          QUERY TEXT:    Patient admitted with anorectal fistula and abscess. Per Op note dated    documentation of Excisional debridement 5 x 2 cm perianal tissue of chronic  perianal abscess mentioned. To accurately reflect the procedure performed   please document the deepest depth of tissue removed as down to and including:    The medical record reflects the following:  Risk Factors: abscess with fistula  Clinical Indicators: Op note  \"Excisional debridement 5 x 2 cm perianal   tissue of chronic perianal abscess. At this point in time, I elected to do an   elliptical incision to include the fistulous opening on the skin and going   laterally, and we entered into another  abscess cavity and the pus was drained. I did debridement of chronically   indurated tissue, the length of 5 x 2 cm.\"  Treatment: s/p I&D  Options provided:  -- Excisional debridement of skin  -- Excisional debridement of subcutaneous tissue  -- Excisional debridement of fascia  -- Other - I will add my own diagnosis  -- Disagree - Not applicable / Not valid  -- Disagree - Clinically unable to determine / Unknown  -- Refer to Clinical Documentation Reviewer    PROVIDER RESPONSE TEXT:    Excisional debridement of subcutaneous tissue of perianal tissue was performed   during procedure on .    Query created by: Carmencita Manjarrez on 2023 1:58 PM      Electronically signed by:  Adalberto Hines MD 1/10/2024 11:33 AM

## 2024-09-04 RX ORDER — FERROUS SULFATE 325(65) MG
325 TABLET ORAL
COMMUNITY

## 2024-09-04 RX ORDER — OXYBUTYNIN CHLORIDE 10 MG/1
10 TABLET, EXTENDED RELEASE ORAL DAILY
COMMUNITY

## 2024-09-04 RX ORDER — TIMOLOL MALEATE 5 MG/ML
1 SOLUTION/ DROPS OPHTHALMIC DAILY
COMMUNITY

## 2024-09-04 RX ORDER — METFORMIN HCL 500 MG
500 TABLET, EXTENDED RELEASE 24 HR ORAL 2 TIMES DAILY
COMMUNITY

## 2024-09-04 RX ORDER — FLUTICASONE PROPIONATE 50 MCG
2 SPRAY, SUSPENSION (ML) NASAL DAILY
Status: ON HOLD | COMMUNITY
End: 2024-09-06 | Stop reason: HOSPADM

## 2024-09-04 NOTE — PROGRESS NOTES
PAT call attempted, patient unavailable, left message to please call us back at your earliest convenience; [phone].

## 2024-09-04 NOTE — PROGRESS NOTES
PAT call attempted, patient unavailable, phone number does not work.  Called Dr. Hines's office to see if they had a different number and they had same #.

## 2024-09-04 NOTE — FLOWSHEET NOTE
Date/Time of Note


Date/Time of Note


DATE: 10/7/17 


TIME: 13:13





Delivery Summary


normal vaginal delivery


Weeks of Gestation


40w5d


Placenta Delivered:  Spontaneously


Meconium:  none


Episiotomy:  Yes


Indication for episiotomy


bleeding from vaginal laceration on ironing


Laceration repair:  


MLE   00ch gut


Anesthesia type:  Epidural


Sponge & Needle done & correct:  Yes


All needle counts correct:  Yes


Any foreign bodies felt in the:  No


Problems:  





Infant Delivery Information


Sex


Infant Sex:  male





Apgars


1 Minute:  9


5 Minute:  9





Suctioning


Nose & mouth suctioned at alexandru:  Yes


Delee suction performed:  No





Umbilical Cord


Umbilical cord with:  3 Vessels


Cord presentations:  nuchal cord


Nuchal cord present X:  1


Cord Blood was obtained:  Yes





Mother & Baby Disposition


Disposition


Mom & Baby to Maternity; Good:  Yes


Mom transferred to:  Other


Baby to NICU:  No (postpartum)











SAM VOSS MD Oct 7, 2017 13:18 Patient uses CPAP machine at night.  Instructed patient to bring day of surgery.  Pt verbalized understanding.

## 2024-09-04 NOTE — PROGRESS NOTES
Follow all instructions given by your physician  Do not eat or drink anything after midnight prior to surgery(includes water, chewing gum, mints and ice chips)  Sips of water am of surgery with allowed medications  May brush teeth   Do not smoke or chew tobacco, drink alcoholic beverages or use any illicit drugs for 24 hours prior to surgery  Bring insurance info and photo ID  Bring pertinent paperwork with you from Doctor or surgeons's office  Wear clean comfortable, loose-fitting clothing  No make-up, nail polish, jewelry, piercings, or contact lenses to be worn day of surgery  No glue on dentures morning of surgery; you will be asked to remove them for surgery. Case for glasses.  Shower the night before and the morning of surgery with cleansing soap provided or a liquid antibacterial soap, dry with new fresh clean towel after each shower, no lotions, creams or powder.  Clean sheets and pillowcase on bed night before surgery  Bring medications in original bottles, Bring rescue inhalers with you  Bring CPAP/BIPAP machine if you have one ( you may be charged if one is needed in recovery room )  Yes    Do you have a DNR?   Please Bring Healthcare Directive or Healthcare Power of  in so we can scan it into your chart.    Our pharmacy has a Meds to Beds program where they will deliver any new prescriptions you may have to your room before you leave. Our Pharmacy will clear it through your insurance; for example (same co pay). This enables you to take your new RX as soon as you need when you get home and avoids stop/wait delays on the way home.  Please have a form of payment with you and have someone designated as your Pharmacy contact with their phone # as you may not feel well or still be under the influence of anesthesia.    Please refer to the SSI-Surgical Site Infection Flyer you hopefully received in the mail-together we can prevent infections; signs and symptoms reviewed.  When discharged be sure you

## 2024-09-05 ENCOUNTER — ANESTHESIA EVENT (OUTPATIENT)
Dept: OPERATING ROOM | Age: 54
End: 2024-09-05
Payer: COMMERCIAL

## 2024-09-05 NOTE — H&P
[address] [address]                            PREOPERATIVE H&P      PATIENT NAME: OWEN GOULD               : 1970  MED REC NO: [id number]                       ROOM:   ACCOUNT NO: [id number]                       ADMIT DATE: 2024  PROVIDER: Adalberto Hines MD      CHIEF COMPLAINT:  Perianal fistula.    HISTORY OF PRESENT ILLNESS:  Patient is a 54-year-old female who about a year and a half ago had a perirectal abscess on her left side, was seen by a former physician here, Dr. Shepherd and undergone two I and Ds of this abscess.  She also had had a hemorrhoid banding.  She then developed an abscess and appeared to have a fistulous tract and was taken to surgery by me on 2023, undergoing a debridement 5 cm x 2 cm of the perianal area with unroofing of what I thought to be a chronic fistula.  She was seen until March of this year, was doing well, but over the last several months has had onset of what appears to be a fistulous tract in the middle of the scar tissue, but did form.  She is being admitted at this time for anal exam under anesthesia and hopefully fistulotomy.    PAST MEDICAL HISTORY:  Positive for hypertension, asthma, reflux disease.    PAST SURGICAL HISTORY:  Includes hemorrhoid banding x3.  She has had I and D of a perirectal abscess, has had orthopedic surgery on her right shoulder.  She has had a bladder suspension and a laparoscopic cholecystectomy.  She has had gastric bypass remotely, tonsillectomy, ventral hernia repair, and excision of the debridement of the perianal abscess as mentioned above.    MEDICATIONS:  Duloxetine, gabapentin, Norco, topiramate.    ALLERGIES:  NONE.      PHYSICAL EXAMINATION:  GENERAL:  Patient is a 54-year-old female, who appears her age.  HEAD, EARS, EYES, NOSE, AND THROAT:  Shows no scleral icterus.  CARDIOVASCULAR:  S1, S2.  LUNGS:  Respirations are

## 2024-09-05 NOTE — H&P
[facility]  History and Physical Update      Pt Name: Selam Espinal  MRN: [id number]  YOB: 1970  Date of evaluation: 9/5/2024    [x] I have examined the patient and reviewed the H&P/Consult and there are no changes to the patient or plans.    [] I have examined the patient and reviewed the H&P/Consult and have noted the following changes:        Adalberto Hines MD  Electronically signed 9/5/2024 at 1:50 PM

## 2024-09-06 ENCOUNTER — HOSPITAL ENCOUNTER (OUTPATIENT)
Age: 54
Setting detail: OUTPATIENT SURGERY
Discharge: HOME | End: 2024-09-06
Attending: SURGERY | Admitting: SURGERY
Payer: COMMERCIAL

## 2024-09-06 ENCOUNTER — ANESTHESIA (OUTPATIENT)
Dept: OPERATING ROOM | Age: 54
End: 2024-09-06
Payer: COMMERCIAL

## 2024-09-06 VITALS
TEMPERATURE: 97.4 F | HEART RATE: 67 BPM | RESPIRATION RATE: 16 BRPM | BODY MASS INDEX: 32.18 KG/M2 | OXYGEN SATURATION: 98 % | SYSTOLIC BLOOD PRESSURE: 125 MMHG | HEIGHT: 67 IN | DIASTOLIC BLOOD PRESSURE: 62 MMHG | WEIGHT: 205 LBS

## 2024-09-06 PROCEDURE — 7100000010 HC PHASE II RECOVERY - FIRST 15 MIN: Performed by: SURGERY

## 2024-09-06 PROCEDURE — 2580000003 HC RX 258: Performed by: SURGERY

## 2024-09-06 PROCEDURE — 7100000001 HC PACU RECOVERY - ADDTL 15 MIN: Performed by: SURGERY

## 2024-09-06 PROCEDURE — 2709999900 HC NON-CHARGEABLE SUPPLY: Performed by: SURGERY

## 2024-09-06 PROCEDURE — 3700000000 HC ANESTHESIA ATTENDED CARE: Performed by: SURGERY

## 2024-09-06 PROCEDURE — 3600000002 HC SURGERY LEVEL 2 BASE: Performed by: SURGERY

## 2024-09-06 PROCEDURE — 6360000002 HC RX W HCPCS: Performed by: SURGERY

## 2024-09-06 PROCEDURE — 2500000003 HC RX 250 WO HCPCS: Performed by: SURGERY

## 2024-09-06 PROCEDURE — 3600000012 HC SURGERY LEVEL 2 ADDTL 15MIN: Performed by: SURGERY

## 2024-09-06 PROCEDURE — 3700000001 HC ADD 15 MINUTES (ANESTHESIA): Performed by: SURGERY

## 2024-09-06 PROCEDURE — 7100000000 HC PACU RECOVERY - FIRST 15 MIN: Performed by: SURGERY

## 2024-09-06 PROCEDURE — 2580000003 HC RX 258: Performed by: ANESTHESIOLOGY

## 2024-09-06 PROCEDURE — 7100000011 HC PHASE II RECOVERY - ADDTL 15 MIN: Performed by: SURGERY

## 2024-09-06 PROCEDURE — 6360000002 HC RX W HCPCS: Performed by: ANESTHESIOLOGY

## 2024-09-06 RX ORDER — NALOXONE HYDROCHLORIDE 0.4 MG/ML
INJECTION, SOLUTION INTRAMUSCULAR; INTRAVENOUS; SUBCUTANEOUS PRN
Status: DISCONTINUED | OUTPATIENT
Start: 2024-09-06 | End: 2024-09-06 | Stop reason: HOSPADM

## 2024-09-06 RX ORDER — SODIUM CHLORIDE 9 MG/ML
INJECTION, SOLUTION INTRAVENOUS CONTINUOUS
Status: DISCONTINUED | OUTPATIENT
Start: 2024-09-06 | End: 2024-09-06 | Stop reason: HOSPADM

## 2024-09-06 RX ORDER — SODIUM CHLORIDE 9 MG/ML
INJECTION, SOLUTION INTRAVENOUS PRN
Status: DISCONTINUED | OUTPATIENT
Start: 2024-09-06 | End: 2024-09-06 | Stop reason: HOSPADM

## 2024-09-06 RX ORDER — PROPOFOL 10 MG/ML
INJECTION, EMULSION INTRAVENOUS PRN
Status: DISCONTINUED | OUTPATIENT
Start: 2024-09-06 | End: 2024-09-06 | Stop reason: SDUPTHER

## 2024-09-06 RX ORDER — DEXAMETHASONE SODIUM PHOSPHATE 10 MG/ML
INJECTION, EMULSION INTRAMUSCULAR; INTRAVENOUS PRN
Status: DISCONTINUED | OUTPATIENT
Start: 2024-09-06 | End: 2024-09-06 | Stop reason: SDUPTHER

## 2024-09-06 RX ORDER — SODIUM CHLORIDE 0.9 % (FLUSH) 0.9 %
5-40 SYRINGE (ML) INJECTION EVERY 12 HOURS SCHEDULED
Status: DISCONTINUED | OUTPATIENT
Start: 2024-09-06 | End: 2024-09-06 | Stop reason: HOSPADM

## 2024-09-06 RX ORDER — BUPIVACAINE HYDROCHLORIDE AND EPINEPHRINE 5; 5 MG/ML; UG/ML
INJECTION, SOLUTION EPIDURAL; INTRACAUDAL; PERINEURAL PRN
Status: DISCONTINUED | OUTPATIENT
Start: 2024-09-06 | End: 2024-09-06 | Stop reason: ALTCHOICE

## 2024-09-06 RX ORDER — FENTANYL CITRATE 50 UG/ML
50 INJECTION, SOLUTION INTRAMUSCULAR; INTRAVENOUS EVERY 5 MIN PRN
Status: DISCONTINUED | OUTPATIENT
Start: 2024-09-06 | End: 2024-09-06 | Stop reason: HOSPADM

## 2024-09-06 RX ORDER — LIDOCAINE HCL/PF 100 MG/5ML
SYRINGE (ML) INJECTION PRN
Status: DISCONTINUED | OUTPATIENT
Start: 2024-09-06 | End: 2024-09-06 | Stop reason: SDUPTHER

## 2024-09-06 RX ORDER — SODIUM CHLORIDE 0.9 % (FLUSH) 0.9 %
5-40 SYRINGE (ML) INJECTION PRN
Status: DISCONTINUED | OUTPATIENT
Start: 2024-09-06 | End: 2024-09-06 | Stop reason: HOSPADM

## 2024-09-06 RX ORDER — FENTANYL CITRATE 50 UG/ML
INJECTION, SOLUTION INTRAMUSCULAR; INTRAVENOUS PRN
Status: DISCONTINUED | OUTPATIENT
Start: 2024-09-06 | End: 2024-09-06 | Stop reason: SDUPTHER

## 2024-09-06 RX ORDER — SODIUM CHLORIDE 9 MG/ML
INJECTION, SOLUTION INTRAVENOUS CONTINUOUS PRN
Status: DISCONTINUED | OUTPATIENT
Start: 2024-09-06 | End: 2024-09-06 | Stop reason: SDUPTHER

## 2024-09-06 RX ORDER — FENTANYL CITRATE 50 UG/ML
25 INJECTION, SOLUTION INTRAMUSCULAR; INTRAVENOUS EVERY 5 MIN PRN
Status: DISCONTINUED | OUTPATIENT
Start: 2024-09-06 | End: 2024-09-06 | Stop reason: HOSPADM

## 2024-09-06 RX ADMIN — FENTANYL CITRATE 50 MCG: 50 INJECTION, SOLUTION INTRAMUSCULAR; INTRAVENOUS at 10:32

## 2024-09-06 RX ADMIN — PROPOFOL 150 MG: 10 INJECTION, EMULSION INTRAVENOUS at 10:33

## 2024-09-06 RX ADMIN — Medication 100 MG: at 10:33

## 2024-09-06 RX ADMIN — FENTANYL CITRATE 25 MCG: 50 INJECTION, SOLUTION INTRAMUSCULAR; INTRAVENOUS at 10:57

## 2024-09-06 RX ADMIN — FENTANYL CITRATE 50 MCG: 50 INJECTION, SOLUTION INTRAMUSCULAR; INTRAVENOUS at 11:02

## 2024-09-06 RX ADMIN — CEFOXITIN 2000 MG: 2 INJECTION, POWDER, FOR SOLUTION INTRAVENOUS at 10:39

## 2024-09-06 RX ADMIN — FENTANYL CITRATE 25 MCG: 50 INJECTION, SOLUTION INTRAMUSCULAR; INTRAVENOUS at 10:53

## 2024-09-06 RX ADMIN — SODIUM CHLORIDE: 9 INJECTION, SOLUTION INTRAVENOUS at 10:27

## 2024-09-06 RX ADMIN — SODIUM CHLORIDE: 9 INJECTION, SOLUTION INTRAVENOUS at 09:52

## 2024-09-06 RX ADMIN — FENTANYL CITRATE 25 MCG: 50 INJECTION, SOLUTION INTRAMUSCULAR; INTRAVENOUS at 10:51

## 2024-09-06 RX ADMIN — DEXAMETHASONE SODIUM PHOSPHATE 10 MG: 10 INJECTION, EMULSION INTRAMUSCULAR; INTRAVENOUS at 10:50

## 2024-09-06 RX ADMIN — FENTANYL CITRATE 25 MCG: 50 INJECTION, SOLUTION INTRAMUSCULAR; INTRAVENOUS at 10:48

## 2024-09-06 ASSESSMENT — PAIN - FUNCTIONAL ASSESSMENT
PAIN_FUNCTIONAL_ASSESSMENT: 0-10
PAIN_FUNCTIONAL_ASSESSMENT: NONE - DENIES PAIN

## 2024-09-06 NOTE — ANESTHESIA PRE PROCEDURE
Department of Anesthesiology  Preprocedure Note       Name:  Selam Espinal   Age:  54 y.o.  :  1970                                          MRN:  [id number]         Date:  2024      Surgeon: Surgeon(s):  Adalberto Hines MD    Procedure: Procedure(s):  Anal Exam Under Anesthesia,Anal Fistulotomy    Medications prior to admission:   Prior to Admission medications    Medication Sig Start Date End Date Taking? Authorizing Provider   oxyBUTYnin (DITROPAN-XL) 10 MG extended release tablet Take 1 tablet by mouth daily   Yes Mitchell King MD   metFORMIN (GLUCOPHAGE-XR) 500 MG extended release tablet Take 1 tablet by mouth in the morning and at bedtime   Yes ProviderMitchell MD   ferrous sulfate (FEROSUL) 325 (65 Fe) MG tablet Take 1 tablet by mouth daily (with breakfast)   Yes ProviderMitchell MD   timolol (TIMOPTIC) 0.5 % ophthalmic solution Place 1 drop into both eyes daily   Yes Mitchell King MD   DULoxetine (CYMBALTA) 30 MG extended release capsule Take 2 capsules by mouth 2 times daily 23  Yes Mitchell King MD   topiramate (TOPAMAX) 25 MG tablet Take 2 tablets by mouth 2 times daily 3/3/23  Yes Provider, MD Mitchell   latanoprost (XALATAN) 0.005 % ophthalmic solution latanoprost 0.005 % eye drops   Yes ProviderMitchell MD   albuterol sulfate HFA (PROVENTIL;VENTOLIN;PROAIR) 108 (90 Base) MCG/ACT inhaler albuterol sulfate HFA 90 mcg/actuation aerosol inhaler   Yes ProviderMitchell MD   gabapentin (NEURONTIN) 800 MG tablet Take 1 tablet by mouth in the morning, at noon, in the evening, and at bedtime.   Yes Mitchell King MD   HYDROcodone-acetaminophen (NORCO) 5-325 MG per tablet Take 1 tablet by mouth every 8 hours as needed. 23  Yes Mitchell King MD   fluticasone (FLONASE) 50 MCG/ACT nasal spray 2 sprays by Nasal route daily  Patient not taking: Reported on 2024    Mitchell King MD   ondansetron (ZOFRAN-ODT) 4 MG

## 2024-09-06 NOTE — BRIEF OP NOTE
Brief Postoperative Note      Patient: Selam Espinal  YOB: 1970  MRN: [id number]    Date of Procedure: 9/6/2024    Pre-Op Diagnosis Codes:      * Anal pain [K62.89]     * Fistula [L98.8]    Post-Op Diagnosis: Anal Fistula       Procedure(s):  Anal Exam Under Anesthesia, Anal Fistulotomy    Surgeon(s):  Adalberto Hines MD    Assistant:  First Assistant: Ross Mcfarland RN    Anesthesia: General    Estimated Blood Loss (mL): 3 ml    Complications: none    Specimens:       Implants:        Drains:     Findings:  Infection Present At Time Of Surgery (PATOS) (choose all levels that have infection present):  - Superficial Infection (skin/subcutaneous) present as evidenced by pus  Other Findings: see op note    Electronically signed by Adalberto Hines MD on 9/6/2024 at 11:09 AM

## 2024-09-06 NOTE — PROGRESS NOTES
Pt returned to [facility] room 14. Vitals and assessment as charted. 0.9 infusing, to count from PACU. Pt has crackers and water. Family at the bedside. Pt and family verbalized understanding of discharge criteria and call light use. Call light in reach.

## 2024-09-06 NOTE — PROGRESS NOTES
Pt has met discharge criteria and states she is ready for discharge to home. IV removed, gauze and tape applied. Dressed in own clothes and personal belongings gathered. Discharge instructions (with opioid medication education information) given to pt and family; pt and family verbalized understanding of discharge instructions, prescriptions and follow up appointments. Pt transported to discharge lobby by \[facility]\"" staff.

## 2024-09-06 NOTE — DISCHARGE INSTRUCTIONS
GENERAL ANESTHESIA OR SEDATION  1. Do not drive or operate hazardous machinery for 24 hours.  2. Do not make important business or personal decisions for 24 hours.  3. Do not drink alcoholic beverages or use tobacco for 24 hours.    ACTIVITY INSTRUCTIONS:  [] Rest today. Resume light to normal activity tomorrow.   [] You may resume normal activity tomorrow. Do not engage in strenuous activity that may place stress on your incision.  [x] Do not drive for 3-5 days and avoid heavy lifting, tugging, pullings greater than 10-20 lbs until seen in the office.      DIET INSTRUCTIONS:  []Begin with clear liquids. If not nauseated, may increase to a low-fat diet when you desire. Greasy and spicy foods are not advised.  [x]Regular diet as tolerated.  []Other:     MEDICATIONS  [x]Prescription sent with you to be used as directed.   []Lortab   []Vicodin   []Percocet   []Tylenol #3   []Oxycontin   Do not drink alcohol or drive while taking these medications. You may experience dizziness or drowsiness with these medications. You may also experience constipation which can be relieved with stool softners or laxatives.  [x]You may resume your daily prescription medication schedule unless otherwise specified.  [x]Do not take 325mg Aspirin or other blood thinners such as Coumadin or Plavix for 5 days.     WOUND/DRESSING INSTRUCTIONS:  Always ensure you and your care giver clean hands before and after caring for the wound.  [] Keep dressing clean and dry for 48 hours. Change when soiled or wet.      [] Allow steri-strips to fall off on their own.   [x] Ice operative site for 20 minutes 4 times a day.     [x] May wash over incision in shower in 48 hours, but do not soak in a bath.  [] Take sitz bath for 20 minutes twice daily and after bowel movements.  [] Keep the abdominal binder in place during the day. May remove to shower and at night.  [] Remove packing from wound in 24 hours and replace with AMD dressings daily.  [] Empty ROSIE drain  daily and record the amounts.    BREAST PROCEDURES  []Following a breast procedure, it is important to continue to where supportive garments.  []Following a sentinal lymph node biopsy, you should not be alarmed if your urine has a blue color to it. This is your body eliminating the dye used for the procedure.    ABDOMINAL/LAPAROSCOPIC SURGERY  [x]You are encouraged to get up and move around as this helps with the circulation and speeds up the healing process.  [x]Breath deeply and cough from time to time. This helps to clear your lungs and helps prevent pneumonia.  [x]Supporting your incision with a pillow or your hand helps to minimize discomfort and pain.  [x]Laparoscopic patients may develop shoulder pain in the first 48 hours from the gas used during the procedure.    FOLLOW-UP CARE. SPECIFICALLY WATCH FOR:   Fever over 101 degrees by mouth   Increased redness, warmth, hardness at operative site.   Blood soaked dressing (small amounts of oozing may be normal.)   Increased or progressive drainage from the surgical area   Inability to urinate or blood in the urine   Pain not relieved by the medications ordered   Persistent nausea and/or vomiting, unable to retain fluids.    FOLLOW-UP APPOINTMENT   []1 week   []2 weeks   []Other    Call my office if you have any problem that concerns you . After hours, you can reach the answering service via the office phone number. IF YOU NEED IMMEDIATE ATTENTION, GO TO THE EMERGENCY ROOM AND YOUR DOCTOR WILL BE CONTACTED.

## 2024-09-06 NOTE — PROGRESS NOTES
1106: pt arrives to pacu, respirations unlabored on room air. Pt awakens to voice and follows commands.   1117: pt states pain 4/10 and tolerable. States she can tolerate a 6/10  1126: pt requests ice chips, tolerates well.   1136: pt meets criteria for discharge from pacu at this time. pt transported to [facility] in stable condition.   1141: Report given to Leanne HUMPHRIES.

## 2024-09-06 NOTE — OP NOTE
[address] [address]                            OPERATIVE REPORT      PATIENT NAME: OWEN GOULD               : 1970  MED REC NO: [id number]                       ROOM: [facility]                                               (General) POOL                                               RM    ACCOUNT NO: [id number]                       ADMIT DATE: 2024  PROVIDER: Adalberto Hines MD      DATE OF PROCEDURE:  2024    SURGEON:  Adalberto Hines MD    PREOPERATIVE DIAGNOSIS:  Anal fistula.    POSTOPERATIVE DIAGNOSIS:  Anal fistula.    OPERATIONS:    1. Anal exam under anesthesia.  2. Anal fistulotomy.    ANESTHESIA:  General.    COMPLICATIONS:  None.    BLOOD LOSS:  2 mL.    INDICATION FOR PROCEDURE:  The patient is a 54-year-old female, who has had a perianal abscess surgery x2, and then eventually had a debridement of infected perianal tissue along with a fistulotomy, although it was retro sphincter.  The patient over the last 6 months has had a persistent fistula draining point.  The tissue is well healed, but has an apparent anal fistula and here for fistulotomy.    FINDINGS:  The patient did have a tract that seemed to go directly into the anal canal.  The fistulous opening was at about the 3 o'clock position.  A fistulotomy was performed with the Bovie.    DESCRIPTION OF PROCEDURE:  The patient was brought to the operating suite, placed supine on the operating room table.  After adequate inhalational anesthesia was administered, the patient was placed up in stirrups and the perianal areas prepped and draped in the usual sterile fashion.  Retractor was placed in the anus along with a digital exam, and there were no abnormalities.  The fistulous opening was at about 3 o'clock position on the left side of the anal opening, and a probe was passed and easily appeared to go through a tract and come out through the anal

## 2024-09-06 NOTE — ANESTHESIA POSTPROCEDURE EVALUATION
Department of Anesthesiology  Postprocedure Note    Patient: Selam Espinal  MRN: [id number]  YOB: 1970  Date of evaluation: 9/6/2024    Procedure Summary       Date: 09/06/24 Room / Location: [facility] OR 07 / [facility] OR    Anesthesia Start: 1027 Anesthesia Stop: 1109    Procedure: Anal Exam Under Anesthesia, Anal Fistulotomy Diagnosis:       Anal pain      Fistula      (Anal pain [K62.89])      (Fistula [L98.8])    Surgeons: Adalberto Hines MD Responsible Provider: Pankaj Avilez DO    Anesthesia Type: general ASA Status: 2            Anesthesia Type: No value filed.    Demetrius Phase I: Demetrius Score: 10    Demetrius Phase II:      Anesthesia Post Evaluation    Patient participation: complete - patient participated  Level of consciousness: awake  Airway patency: patent  Nausea & Vomiting: no vomiting and no nausea  Cardiovascular status: hemodynamically stable  Respiratory status: acceptable  Hydration status: stable  Pain management: adequate    No notable events documented.

## 2024-12-17 NOTE — PROGRESS NOTES
PAT call attempted, patient unavailable, left message to please call us back at your earliest convenience; [phone]

## 2024-12-18 RX ORDER — DULOXETIN HYDROCHLORIDE 60 MG/1
60 CAPSULE, DELAYED RELEASE ORAL 2 TIMES DAILY
COMMUNITY

## 2024-12-18 NOTE — PROGRESS NOTES
Follow all instructions given by your physician  Do not eat or drink anything after midnight prior to surgery(includes water, chewing gum, mints and ice chips)  Sips of water am of surgery with allowed medications  May brush teeth   Do not smoke or chew tobacco, drink alcoholic beverages or use any illicit drugs for 24 hours prior to surgery  Bring insurance info and photo ID  Bring pertinent paperwork with you from Doctor or surgeons's office  Wear clean comfortable, loose-fitting clothing  No make-up, nail polish, jewelry, piercings, or contact lenses to be worn day of surgery  No glue on dentures morning of surgery; you will be asked to remove them for surgery. Case for glasses.  Shower the night before and the morning of surgery with cleansing soap provided or a liquid antibacterial soap, dry with new fresh clean towel after each shower, no lotions, creams or powder.  Clean sheets and pillowcase on bed night before surgery  Bring medications in original bottles, Bring rescue inhalers with you  Bring CPAP/BIPAP machine if you have one ( you may be charged if one is needed in recovery room )    Do you have a DNR? No  Please Bring Healthcare Directive or Healthcare Power of  in so we can scan it into your chart.    Our pharmacy has a Meds to Beds program where they will deliver any new prescriptions you may have to your room before you leave. Our Pharmacy will clear it through your insurance; for example (same co pay). This enables you to take your new RX as soon as you need when you get home and avoids stop/wait delays on the way home.  Please have a form of payment with you and have someone designated as your Pharmacy contact with their phone # as you may not feel well or still be under the influence of anesthesia.    Please refer to the SSI-Surgical Site Infection Flyer you hopefully received in the mail-together we can prevent infections; signs and symptoms reviewed.  When discharged be sure you

## 2024-12-30 ENCOUNTER — ANESTHESIA EVENT (OUTPATIENT)
Dept: OPERATING ROOM | Age: 54
End: 2024-12-30
Payer: COMMERCIAL

## 2025-01-02 ENCOUNTER — HOSPITAL ENCOUNTER (OUTPATIENT)
Age: 55
Setting detail: OUTPATIENT SURGERY
Discharge: HOME | End: 2025-01-02
Attending: ORTHOPAEDIC SURGERY | Admitting: ORTHOPAEDIC SURGERY
Payer: COMMERCIAL

## 2025-01-02 ENCOUNTER — ANESTHESIA (OUTPATIENT)
Dept: OPERATING ROOM | Age: 55
End: 2025-01-02
Payer: COMMERCIAL

## 2025-01-02 VITALS
TEMPERATURE: 97.1 F | BODY MASS INDEX: 30.69 KG/M2 | RESPIRATION RATE: 18 BRPM | SYSTOLIC BLOOD PRESSURE: 128 MMHG | OXYGEN SATURATION: 97 % | DIASTOLIC BLOOD PRESSURE: 71 MMHG | HEIGHT: 67 IN | HEART RATE: 60 BPM | WEIGHT: 195.5 LBS

## 2025-01-02 DIAGNOSIS — G89.18 POSTOPERATIVE PAIN: Primary | ICD-10-CM

## 2025-01-02 PROCEDURE — 2580000003 HC RX 258: Performed by: ORTHOPAEDIC SURGERY

## 2025-01-02 PROCEDURE — 3700000000 HC ANESTHESIA ATTENDED CARE: Performed by: ORTHOPAEDIC SURGERY

## 2025-01-02 PROCEDURE — 6360000002 HC RX W HCPCS: Performed by: NURSE ANESTHETIST, CERTIFIED REGISTERED

## 2025-01-02 PROCEDURE — 3600000014 HC SURGERY LEVEL 4 ADDTL 15MIN: Performed by: ORTHOPAEDIC SURGERY

## 2025-01-02 PROCEDURE — 3600000004 HC SURGERY LEVEL 4 BASE: Performed by: ORTHOPAEDIC SURGERY

## 2025-01-02 PROCEDURE — 2500000003 HC RX 250 WO HCPCS: Performed by: NURSE ANESTHETIST, CERTIFIED REGISTERED

## 2025-01-02 PROCEDURE — C1713 ANCHOR/SCREW BN/BN,TIS/BN: HCPCS | Performed by: ORTHOPAEDIC SURGERY

## 2025-01-02 PROCEDURE — 2720000010 HC SURG SUPPLY STERILE: Performed by: ORTHOPAEDIC SURGERY

## 2025-01-02 PROCEDURE — 6360000002 HC RX W HCPCS: Performed by: ORTHOPAEDIC SURGERY

## 2025-01-02 PROCEDURE — 7100000010 HC PHASE II RECOVERY - FIRST 15 MIN: Performed by: ORTHOPAEDIC SURGERY

## 2025-01-02 PROCEDURE — 7100000001 HC PACU RECOVERY - ADDTL 15 MIN: Performed by: ORTHOPAEDIC SURGERY

## 2025-01-02 PROCEDURE — 3700000001 HC ADD 15 MINUTES (ANESTHESIA): Performed by: ORTHOPAEDIC SURGERY

## 2025-01-02 PROCEDURE — 2500000003 HC RX 250 WO HCPCS: Performed by: ORTHOPAEDIC SURGERY

## 2025-01-02 PROCEDURE — 6370000000 HC RX 637 (ALT 250 FOR IP): Performed by: ORTHOPAEDIC SURGERY

## 2025-01-02 PROCEDURE — 7100000000 HC PACU RECOVERY - FIRST 15 MIN: Performed by: ORTHOPAEDIC SURGERY

## 2025-01-02 PROCEDURE — 6360000002 HC RX W HCPCS: Performed by: STUDENT IN AN ORGANIZED HEALTH CARE EDUCATION/TRAINING PROGRAM

## 2025-01-02 PROCEDURE — 2709999900 HC NON-CHARGEABLE SUPPLY: Performed by: ORTHOPAEDIC SURGERY

## 2025-01-02 PROCEDURE — 64415 NJX AA&/STRD BRCH PLXS IMG: CPT | Performed by: STUDENT IN AN ORGANIZED HEALTH CARE EDUCATION/TRAINING PROGRAM

## 2025-01-02 PROCEDURE — 7100000011 HC PHASE II RECOVERY - ADDTL 15 MIN: Performed by: ORTHOPAEDIC SURGERY

## 2025-01-02 DEVICE — ALPHAVENT SUTURE ANCHOR 4.75MM PEEK, SUTURE ANCHOR WITH 1.4MM AND 1.8MM (NON-SLIDING) XBRAID TT SUTURE TAPE
Type: IMPLANTABLE DEVICE | Status: FUNCTIONAL
Brand: ALPHAVENT

## 2025-01-02 DEVICE — DEVICE FIX NO LOOP VERSITOMIC G-LOK: Type: IMPLANTABLE DEVICE | Status: FUNCTIONAL

## 2025-01-02 DEVICE — 4.5MM STRYKER REELX STT ANCHOR
Type: IMPLANTABLE DEVICE | Status: FUNCTIONAL
Brand: REELX

## 2025-01-02 RX ORDER — SODIUM CHLORIDE 0.9 % (FLUSH) 0.9 %
5-40 SYRINGE (ML) INJECTION PRN
Status: DISCONTINUED | OUTPATIENT
Start: 2025-01-02 | End: 2025-01-02 | Stop reason: HOSPADM

## 2025-01-02 RX ORDER — MEPERIDINE HYDROCHLORIDE 25 MG/ML
12.5 INJECTION INTRAMUSCULAR; INTRAVENOUS; SUBCUTANEOUS EVERY 5 MIN PRN
Status: DISCONTINUED | OUTPATIENT
Start: 2025-01-02 | End: 2025-01-02 | Stop reason: HOSPADM

## 2025-01-02 RX ORDER — SODIUM CHLORIDE 9 MG/ML
INJECTION, SOLUTION INTRAVENOUS PRN
Status: DISCONTINUED | OUTPATIENT
Start: 2025-01-02 | End: 2025-01-02 | Stop reason: HOSPADM

## 2025-01-02 RX ORDER — HYDROCODONE BITARTRATE AND ACETAMINOPHEN 5; 325 MG/1; MG/1
1 TABLET ORAL ONCE
Status: COMPLETED | OUTPATIENT
Start: 2025-01-02 | End: 2025-01-02

## 2025-01-02 RX ORDER — ONDANSETRON 2 MG/ML
4 INJECTION INTRAMUSCULAR; INTRAVENOUS
Status: DISCONTINUED | OUTPATIENT
Start: 2025-01-02 | End: 2025-01-02 | Stop reason: HOSPADM

## 2025-01-02 RX ORDER — SODIUM CHLORIDE 9 MG/ML
INJECTION, SOLUTION INTRAVENOUS CONTINUOUS
Status: DISCONTINUED | OUTPATIENT
Start: 2025-01-02 | End: 2025-01-02 | Stop reason: HOSPADM

## 2025-01-02 RX ORDER — FENTANYL CITRATE 50 UG/ML
INJECTION, SOLUTION INTRAMUSCULAR; INTRAVENOUS
Status: DISCONTINUED | OUTPATIENT
Start: 2025-01-02 | End: 2025-01-02 | Stop reason: SDUPTHER

## 2025-01-02 RX ORDER — PROPOFOL 10 MG/ML
INJECTION, EMULSION INTRAVENOUS
Status: DISCONTINUED | OUTPATIENT
Start: 2025-01-02 | End: 2025-01-02 | Stop reason: SDUPTHER

## 2025-01-02 RX ORDER — ONDANSETRON 2 MG/ML
INJECTION INTRAMUSCULAR; INTRAVENOUS
Status: DISCONTINUED | OUTPATIENT
Start: 2025-01-02 | End: 2025-01-02 | Stop reason: SDUPTHER

## 2025-01-02 RX ORDER — PHENYLEPHRINE HCL IN 0.9% NACL 1 MG/10 ML
SYRINGE (ML) INTRAVENOUS
Status: DISCONTINUED | OUTPATIENT
Start: 2025-01-02 | End: 2025-01-02 | Stop reason: SDUPTHER

## 2025-01-02 RX ORDER — DIPHENHYDRAMINE HYDROCHLORIDE 50 MG/ML
12.5 INJECTION INTRAMUSCULAR; INTRAVENOUS
Status: DISCONTINUED | OUTPATIENT
Start: 2025-01-02 | End: 2025-01-02 | Stop reason: HOSPADM

## 2025-01-02 RX ORDER — HYDROCODONE BITARTRATE AND ACETAMINOPHEN 5; 325 MG/1; MG/1
1 TABLET ORAL EVERY 4 HOURS PRN
Qty: 30 TABLET | Refills: 0 | Status: SHIPPED | OUTPATIENT
Start: 2025-01-02 | End: 2025-01-07

## 2025-01-02 RX ORDER — SODIUM CHLORIDE 0.9 % (FLUSH) 0.9 %
5-40 SYRINGE (ML) INJECTION EVERY 12 HOURS SCHEDULED
Status: DISCONTINUED | OUTPATIENT
Start: 2025-01-02 | End: 2025-01-02 | Stop reason: HOSPADM

## 2025-01-02 RX ORDER — FENTANYL CITRATE 50 UG/ML
50 INJECTION, SOLUTION INTRAMUSCULAR; INTRAVENOUS EVERY 5 MIN PRN
Status: DISCONTINUED | OUTPATIENT
Start: 2025-01-02 | End: 2025-01-02 | Stop reason: HOSPADM

## 2025-01-02 RX ORDER — MIDAZOLAM HYDROCHLORIDE 1 MG/ML
INJECTION, SOLUTION INTRAMUSCULAR; INTRAVENOUS
Status: DISCONTINUED | OUTPATIENT
Start: 2025-01-02 | End: 2025-01-02 | Stop reason: SDUPTHER

## 2025-01-02 RX ORDER — LIDOCAINE HCL/PF 100 MG/5ML
SYRINGE (ML) INJECTION
Status: DISCONTINUED | OUTPATIENT
Start: 2025-01-02 | End: 2025-01-02 | Stop reason: SDUPTHER

## 2025-01-02 RX ORDER — ROPIVACAINE HYDROCHLORIDE 5 MG/ML
INJECTION, SOLUTION EPIDURAL; INFILTRATION; PERINEURAL
Status: COMPLETED | OUTPATIENT
Start: 2025-01-02 | End: 2025-01-02

## 2025-01-02 RX ORDER — EPINEPHRINE 1 MG/ML
INJECTION, SOLUTION, CONCENTRATE INTRAVENOUS PRN
Status: DISCONTINUED | OUTPATIENT
Start: 2025-01-02 | End: 2025-01-02 | Stop reason: ALTCHOICE

## 2025-01-02 RX ORDER — NALOXONE HYDROCHLORIDE 0.4 MG/ML
INJECTION, SOLUTION INTRAMUSCULAR; INTRAVENOUS; SUBCUTANEOUS PRN
Status: DISCONTINUED | OUTPATIENT
Start: 2025-01-02 | End: 2025-01-02 | Stop reason: HOSPADM

## 2025-01-02 RX ORDER — ROCURONIUM BROMIDE 10 MG/ML
INJECTION, SOLUTION INTRAVENOUS
Status: DISCONTINUED | OUTPATIENT
Start: 2025-01-02 | End: 2025-01-02 | Stop reason: SDUPTHER

## 2025-01-02 RX ORDER — BUPIVACAINE HYDROCHLORIDE 5 MG/ML
INJECTION, SOLUTION EPIDURAL; INTRACAUDAL PRN
Status: DISCONTINUED | OUTPATIENT
Start: 2025-01-02 | End: 2025-01-02 | Stop reason: ALTCHOICE

## 2025-01-02 RX ORDER — DEXAMETHASONE SODIUM PHOSPHATE 10 MG/ML
INJECTION, EMULSION INTRAMUSCULAR; INTRAVENOUS
Status: DISCONTINUED | OUTPATIENT
Start: 2025-01-02 | End: 2025-01-02 | Stop reason: SDUPTHER

## 2025-01-02 RX ADMIN — PROPOFOL 150 MG: 10 INJECTION, EMULSION INTRAVENOUS at 10:28

## 2025-01-02 RX ADMIN — FENTANYL CITRATE 50 MCG: 50 INJECTION, SOLUTION INTRAMUSCULAR; INTRAVENOUS at 10:25

## 2025-01-02 RX ADMIN — ONDANSETRON 4 MG: 2 INJECTION INTRAMUSCULAR; INTRAVENOUS at 12:13

## 2025-01-02 RX ADMIN — FENTANYL CITRATE 50 MCG: 50 INJECTION, SOLUTION INTRAMUSCULAR; INTRAVENOUS at 12:36

## 2025-01-02 RX ADMIN — WATER 2000 MG: 1 INJECTION INTRAMUSCULAR; INTRAVENOUS; SUBCUTANEOUS at 10:37

## 2025-01-02 RX ADMIN — SODIUM CHLORIDE: 9 INJECTION, SOLUTION INTRAVENOUS at 12:30

## 2025-01-02 RX ADMIN — HYDROCODONE BITARTRATE AND ACETAMINOPHEN 1 TABLET: 5; 325 TABLET ORAL at 13:52

## 2025-01-02 RX ADMIN — Medication 80 MG: at 10:27

## 2025-01-02 RX ADMIN — MIDAZOLAM 2 MG: 1 INJECTION INTRAMUSCULAR; INTRAVENOUS at 10:13

## 2025-01-02 RX ADMIN — SUGAMMADEX 200 MG: 100 INJECTION, SOLUTION INTRAVENOUS at 12:16

## 2025-01-02 RX ADMIN — ROPIVACAINE HYDROCHLORIDE 20 ML: 5 INJECTION, SOLUTION EPIDURAL; INFILTRATION; PERINEURAL at 10:15

## 2025-01-02 RX ADMIN — DEXAMETHASONE SODIUM PHOSPHATE 10 MG: 10 INJECTION, EMULSION INTRAMUSCULAR; INTRAVENOUS at 10:56

## 2025-01-02 RX ADMIN — Medication 100 MCG: at 11:08

## 2025-01-02 RX ADMIN — ROCURONIUM BROMIDE 50 MG: 10 INJECTION INTRAVENOUS at 10:28

## 2025-01-02 RX ADMIN — PROPOFOL 50 MG: 10 INJECTION, EMULSION INTRAVENOUS at 10:49

## 2025-01-02 RX ADMIN — SODIUM CHLORIDE: 9 INJECTION, SOLUTION INTRAVENOUS at 09:59

## 2025-01-02 RX ADMIN — Medication 50 MCG: at 10:56

## 2025-01-02 ASSESSMENT — PAIN DESCRIPTION - LOCATION: LOCATION: SHOULDER

## 2025-01-02 ASSESSMENT — PAIN - FUNCTIONAL ASSESSMENT
PAIN_FUNCTIONAL_ASSESSMENT: NONE - DENIES PAIN
PAIN_FUNCTIONAL_ASSESSMENT: PREVENTS OR INTERFERES SOME ACTIVE ACTIVITIES AND ADLS

## 2025-01-02 ASSESSMENT — PAIN DESCRIPTION - DESCRIPTORS: DESCRIPTORS: ACHING

## 2025-01-02 ASSESSMENT — PAIN SCALES - GENERAL
PAINLEVEL_OUTOF10: 0
PAINLEVEL_OUTOF10: 6

## 2025-01-02 ASSESSMENT — LIFESTYLE VARIABLES: SMOKING_STATUS: 0

## 2025-01-02 ASSESSMENT — PAIN DESCRIPTION - FREQUENCY: FREQUENCY: CONTINUOUS

## 2025-01-02 ASSESSMENT — PAIN DESCRIPTION - PAIN TYPE: TYPE: SURGICAL PAIN

## 2025-01-02 ASSESSMENT — PAIN DESCRIPTION - ONSET: ONSET: ON-GOING

## 2025-01-02 ASSESSMENT — PAIN DESCRIPTION - ORIENTATION: ORIENTATION: RIGHT

## 2025-01-02 NOTE — PROGRESS NOTES
1234 Patient arrived to PACU. Patient arouses to voice and follows commands. RUE dressing CDI. Shoulder immobilizer in place. Ice pack applied. Patient denies pain. Respirations even and unlabored. VSS.    1245 Patient awake in bed. Patient tolerating ice chips well. Patient denies pain and nausea. Respirations even and unlabored. VSS.     1255 Patient awake in bed. Patient tolerating ice chips well. Patient denies pain and nausea. Respirations even and unlabored. VSS.    1304 Patient meets criteria to discharge from PACU at this time. Patient transported to [facility] in stable condition with all belongings.

## 2025-01-02 NOTE — PROGRESS NOTES
1008- pt to pacu for pre op nerve block    1012-Time out complete    1013-Nerve block started     1016-Nerve block completed. Pt tolerated well    1020-Pt to OR in stable condition

## 2025-01-02 NOTE — PROGRESS NOTES
Patient oriented to Same Day department and admitted to Same Day Surgery room 17.   Patient verbalized approval for first name, last initial with physician name on unit whiteboard.     Plan of care reviewed with patient.   Patient room whiteboard filled out and discussed with patient and responsible adult.       Call light in reach.   Bed in lowest position, locked, with one bed rail up.   SCDs and warming blanket in place.  Appropriate arm bands on patient.   Bathroom offered.   All questions and concerns of patient addressed.        Meds to Beds:   Patient informed of [facility] Meds to Beds program during admission. Patient is agreeable to program.   Contact information for the pharmacy and the Meds to Beds program:    Name: Sai   Relationship to patient:child   Phone number: [phone]

## 2025-01-02 NOTE — BRIEF OP NOTE
Brief Postoperative Note      Patient: Selam Espinal  YOB: 1970  MRN: [id number]    Date of Procedure: 1/2/2025    Pre-Op Diagnosis Codes:   Right shoulder recurrent rotator cuff tear, biceps tenodesis strain    Post-Op Diagnosis: Same       Procedure(s):  RIGHT SHOULDER ARTHROSCOPY, REVISION ROTATOR CUFF REPAIR, REVISION OPEN SUBPEC BICEP TENODESIS    Surgeon(s):  Gen Barkley DO    Assistant:  Physician Assistant: Huber Dia PA-C    Anesthesia: General    Estimated Blood Loss (mL): Minimal    Complications: None    Specimens:   * No specimens in log *    Implants:  Implant Name Type Inv. Item Serial No.  Lot No. LRB No. Used Action   DEVICE FIX NO LOOP VERSITOMIC G-AIDA - [id number]  DEVICE FIX NO LOOP VERSITOMIC G-AIDA  [employer] [id number] Right 1 Implanted   ANCHOR SUTURE ROMMEL 4.75 MM TAPE 1.8/1.4 MM PEEK NONSLIDING - [id number]  ANCHOR SUTURE ROMMEL 4.75 MM TAPE 1.8/1.4 MM PEEK NONSLIDING  [employer] [id number] Right 1 Implanted   ANCHOR SUT DIA4.5MM KNOTLESS PEEK REELX STT - [id number]  ANCHOR SUT DIA4.5MM KNOTLESS PEEK REELX STT  [employer] [id number] Right 1 Implanted         Drains: * No LDAs found *    Findings:  Infection Present At Time Of Surgery (PATOS) (choose all levels that have infection present):  No infection present  Other Findings: postop diagnosis confirmed    Electronically signed by Huber Dia PA-C on 1/2/2025 at 12:24 PM

## 2025-01-02 NOTE — H&P
Orthopedic H&P      CHIEF COMPLAINT: Right shoulder    HISTORY OF PRESENT ILLNESS:      The patient is a 54 y.o. female with continued right shoulder pain status post right shoulder arthroscopy now over 1 year ago.  This does stem from a [facility] claim.  Ready for surgery today.    Past Medical History:    Past Medical History:   Diagnosis Date    Anxiety     Arthritis     knees bilat, back, shoulder right    Asthma     Bipolar disorder (HCC)     Depression     Glaucoma     Hypertension     Neuropathy     Wears glasses        Past Surgical History:    Past Surgical History:   Procedure Laterality Date    ANUS SURGERY N/A 9/6/2024    Anal Exam Under Anesthesia, Anal Fistulotomy performed by Adalberto Hines MD at [facility] OR    BLADDER SUSPENSION      CHOLECYSTECTOMY, LAPAROSCOPIC  2013    COLONOSCOPY  11/30/2018     [facility]    ENDOSCOPY, COLON, DIAGNOSTIC      FOOT SURGERY Left 03/2016    screws placed in foot for fracture    GASTRIC BYPASS SURGERY  2002    HEEL SPUR SURGERY Right     RECTAL SURGERY N/A 12/9/2023    Excisional Debridement, Perirectal abscess performed by Adalberto Hines MD at [facility] OR    TONSILLECTOMY      as child    VENTRAL HERNIA REPAIR  2005    Dr. Tran       Medications Prior to Admission:   Prior to Admission medications    Medication Sig Start Date End Date Taking? Authorizing Provider   DULoxetine (CYMBALTA) 60 MG extended release capsule Take 1 capsule by mouth 2 times daily   Yes ProviderMitchell MD   oxyBUTYnin (DITROPAN-XL) 10 MG extended release tablet Take 1 tablet by mouth nightly   Yes Mitchell King MD   metFORMIN (GLUCOPHAGE-XR) 500 MG extended release tablet Take 1 tablet by mouth in the morning and at bedtime   Yes ProviderMitchell MD   timolol (TIMOPTIC) 0.5 % ophthalmic solution Place 1 drop into both eyes daily   Yes Mitchell King MD   topiramate (TOPAMAX) 25 MG tablet Take 2 tablets by mouth 2 times daily 3/3/23  Yes Provider

## 2025-01-02 NOTE — DISCHARGE INSTRUCTIONS
Orthopedic Discharge Instructions:  Weight bearing status: No lifting, pushing or pulling   for the right arm. Keep dressing clean and dry.  Ok to remove sling at least 3x/day for elbow, wrist and hand exercises. Ok for gentle pendulum shoulder exercise only.  If bicep tenodesis performed. No active elbow flexion and supination. No forced elbow extension. Nothing heavier than coffee cup.  Starting 3 days after surgery, Ok to remove dressing and replace with Band-aids daily until wound is dry. Then leave open to air.  Ice (20 minutes on and off 1 hour) and elevate as needed to reduce swelling and throbbing pain.  If Dressing allowed to be removed, Starting 3 days after surgery, if wound is no longer leaking, Ok to shower but no soaks or baths.  Advance diet as tolerated: begin with clear liquids.  Drink plenty of fluids.  Call the office or come to Emergency Room if signs of infection appear (hot, swollen, red, draining pus, fever)  Take medications as prescribed.  Wean off narcotics (percocet/norco) as soon as possible. Do not take tylenol if still taking narcotics.  Follow up with Dr. Barkley in his office in 10-14 days after surgery. Call [phone] ext [phone] to schedule/confirm.

## 2025-01-02 NOTE — ANESTHESIA PROCEDURE NOTES
Peripheral Block    Patient location during procedure: PACU  Reason for block: post-op pain management and at surgeon's request  Start time: 1/2/2025 10:15 AM  End time: 1/2/2025 10:20 AM  Staffing  Performed: anesthesiologist   Anesthesiologist: Jonah Proctor DO  Performed by: Jonah Proctor DO  Authorized by: Jonah Proctor DO    Preanesthetic Checklist  Completed: patient identified, IV checked, site marked, risks and benefits discussed, surgical/procedural consents, equipment checked, pre-op evaluation, timeout performed, anesthesia consent given, oxygen available, monitors applied/VS acknowledged, fire risk safety assessment completed and verbalized and blood product R/B/A discussed and consented  Peripheral Block   Patient position: supine  Prep: ChloraPrep  Provider prep: mask and sterile gloves  Patient monitoring: cardiac monitor, continuous pulse ox, frequent blood pressure checks, IV access and responsive to questions  Block type: Brachial plexus  Interscalene  Laterality: right  Injection technique: single-shot  Guidance: ultrasound guided    Needle   Needle type: insulated echogenic nerve stimulator needle   Needle gauge: 20 G  Needle localization: ultrasound guidance  Needle length: 10 cm  Assessment   Injection assessment: negative aspiration for heme, no paresthesia on injection, local visualized surrounding nerve on ultrasound and no intravascular symptoms  Paresthesia pain: none  Slow fractionated injection: yes  Hemodynamics: stable  Outcomes: uncomplicated and patient tolerated procedure well    Medications Administered  ropivacaine (NAROPIN) injection 0.5% - Perineural   20 mL - 1/2/2025 10:15:00 AM

## 2025-01-03 NOTE — ANESTHESIA POSTPROCEDURE EVALUATION
Department of Anesthesiology  Postprocedure Note    Patient: Selam Espinal  MRN: [id number]  YOB: 1970  Date of evaluation: 1/3/2025    Procedure Summary       Date: 01/02/25 Room / Location: [facility] OR 03 / [facility] OR    Anesthesia Start: 1022 Anesthesia Stop: 1238    Procedure: RIGHT SHOULDER ARTHROSCOPY, REVISION ROTATOR CUFF REPAIR, REVISION OPEN SUBPEC BICEP TENODESIS (Right) Diagnosis:       Tear of right rotator cuff, unspecified tear extent, unspecified whether traumatic      (Tear of right rotator cuff, unspecified tear extent, unspecified whether traumatic [M75.101])    Surgeons: Gen Barkley DO Responsible Provider: Jonah Proctor DO    Anesthesia Type: general, regional ASA Status: 2            Anesthesia Type: No value filed.    Demetrius Phase I: Demetrius Score: 10    Demetrius Phase II: Demetrius Score: 10    Anesthesia Post Evaluation    Patient location during evaluation: bedside  Patient participation: complete - patient participated  Level of consciousness: awake and alert  Airway patency: patent  Nausea & Vomiting: no vomiting and no nausea  Cardiovascular status: hemodynamically stable  Respiratory status: acceptable  Hydration status: stable  Pain management: adequate    No notable events documented.

## (undated) DEVICE — SUTURE PROL SZ 3-0 L18IN NONABSORBABLE BLU L30MM PS-1 3/8 8663G

## (undated) DEVICE — GLOVE ORANGE PI 8   MSG9080

## (undated) DEVICE — SYRINGE MED 10ML LUERLOCK TIP W/O SFTY DISP

## (undated) DEVICE — PENCIL SMK EVAC ALL IN 1 DSGN ENH VISIBILITY IMPROVED AIR

## (undated) DEVICE — DRESSING,GAUZE,XEROFORM,CURAD,5"X9",ST: Brand: CURAD

## (undated) DEVICE — SOLUTION PREP PAINT POV IOD FOR SKIN MUCOUS MEM

## (undated) DEVICE — SOLUTION IRRIG 3000ML 0.9% SOD CHL USP UROMATIC PLAS CONT

## (undated) DEVICE — GLOVE SURG SZ 7.5 L11.73IN FNGR THK9.8MIL STRW LTX POLYMER

## (undated) DEVICE — ACL DISPOSABLE PACK (6 PER PKG)

## (undated) DEVICE — SUTURE PDS + SZ 2 0 L27IN ABSRB VLT L26MM CT 2 1 2 CIR PDP333H

## (undated) DEVICE — GAUZE,PACKING STRIP,IODOFORM,1"X5YD,STRL: Brand: CURAD

## (undated) DEVICE — GLOVE ORANGE PI 7 1/2   MSG9075

## (undated) DEVICE — LITHOTOMY: Brand: MEDLINE INDUSTRIES, INC.

## (undated) DEVICE — [ARTHROSCOPY PUMP,  DO NOT USE IF PACKAGE IS DAMAGED,  KEEP DRY,  KEEP AWAY FROM SUNLIGHT,  PROTECT FROM HEAT AND RADIOACTIVE SOURCES.]: Brand: FLOSTEADY

## (undated) DEVICE — GOWN,SIRUS,NONRNF,SETINSLV,XL,20/CS: Brand: MEDLINE

## (undated) DEVICE — PACK-MAJOR

## (undated) DEVICE — [THREADED CANNULA.  DO NOT RESTERILIZE,  DO NOT USE IF PACKAGE IS DAMAGED]: Brand: DRI-LOK

## (undated) DEVICE — PREMIUM DRY TRAY LF: Brand: MEDLINE INDUSTRIES, INC.

## (undated) DEVICE — PERI DRAPES

## (undated) DEVICE — HYPODERMIC SAFETY NEEDLE: Brand: MAGELLAN

## (undated) DEVICE — T-MAX DISPOSABLE FACE MASK 8 PER BOX

## (undated) DEVICE — BASIC SINGLE BASIN BTC-LF: Brand: MEDLINE INDUSTRIES, INC.

## (undated) DEVICE — SOLUTION SCRB 4OZ 7.5% POVIDONE IOD ANTIMIC BTL

## (undated) DEVICE — [RESECTOR CUTTER, ARTHROSCOPIC SHAVER BLADE,  DO NOT RESTERILIZE,  DO NOT USE IF PACKAGE IS DAMAGED,  KEEP DRY,  KEEP AWAY FROM SUNLIGHT]: Brand: FORMULA

## (undated) DEVICE — PAD,SANITARY,11 IN,MAXI,W/WINGS,N-STRL: Brand: MEDLINE

## (undated) DEVICE — SHOULDER: Brand: MEDLINE INDUSTRIES, INC.

## (undated) DEVICE — Device

## (undated) DEVICE — MAT FLUID SUCTION

## (undated) DEVICE — SUTURE ETHILON SZ 3-0 L18IN NONABSORBABLE BLK L24MM FS-1 3/8 663G

## (undated) DEVICE — GLOVE SURG SZ 75 L12IN THK75MIL DK GRN LTX FREE

## (undated) DEVICE — AWL SURG SZ 4.75 MM PEEK HRD BODY STRL REUSE ALPHAVENT OMEGA

## (undated) DEVICE — SPONGE GZ W4XL4IN COT 12 PLY TYP VII WVN C FLD DSGN STERILE

## (undated) DEVICE — 90-S CRUISE, SUCTION PROBE, NON-BENDABLE, MAX CUT LEVEL 1: Brand: SERFAS ENERGY

## (undated) DEVICE — TAPE,ELASTIC,FOAM,CURAD,4"X5.5YD,LF: Brand: CURAD

## (undated) DEVICE — DISPOSABLE MULTI BAG ADAPTERS Y                                    TUBING, STERILE, 2 TO A SET 6 SETS                                    PER BOX